# Patient Record
Sex: FEMALE | Race: BLACK OR AFRICAN AMERICAN | NOT HISPANIC OR LATINO | Employment: FULL TIME | RURAL
[De-identification: names, ages, dates, MRNs, and addresses within clinical notes are randomized per-mention and may not be internally consistent; named-entity substitution may affect disease eponyms.]

---

## 2018-01-03 ENCOUNTER — HISTORICAL (OUTPATIENT)
Dept: ADMINISTRATIVE | Facility: HOSPITAL | Age: 23
End: 2018-01-03

## 2018-01-09 LAB
LAB AP CLINICAL INFORMATION: NORMAL
LAB AP GENERAL CAT - HISTORICAL: NORMAL
LAB AP INTERPRETATION/RESULT - HISTORICAL: NORMAL
LAB AP SPECIMEN ADEQUACY - HISTORICAL: NORMAL
LAB AP SPECIMEN SUBMITTED - HISTORICAL: NORMAL

## 2018-07-21 ENCOUNTER — HISTORICAL (OUTPATIENT)
Dept: ADMINISTRATIVE | Facility: HOSPITAL | Age: 23
End: 2018-07-21

## 2018-07-24 LAB
LAB AP CLINICAL INFORMATION: NORMAL
LAB AP DIAGNOSIS - HISTORICAL: NORMAL
LAB AP GROSS PATHOLOGY - HISTORICAL: NORMAL
LAB AP SPECIMEN SUBMITTED - HISTORICAL: NORMAL

## 2020-06-29 ENCOUNTER — HISTORICAL (OUTPATIENT)
Dept: ADMINISTRATIVE | Facility: HOSPITAL | Age: 25
End: 2020-06-29

## 2020-09-28 ENCOUNTER — HISTORICAL (OUTPATIENT)
Dept: ADMINISTRATIVE | Facility: HOSPITAL | Age: 25
End: 2020-09-28

## 2020-09-28 LAB — HCG UR QL IA.RAPID: NEGATIVE

## 2020-09-29 LAB

## 2021-10-22 ENCOUNTER — LAB VISIT (OUTPATIENT)
Dept: PRIMARY CARE CLINIC | Facility: CLINIC | Age: 26
End: 2021-10-22
Payer: MEDICAID

## 2021-10-22 DIAGNOSIS — Z02.83 ENCOUNTER FOR EMPLOYMENT-RELATED DRUG TESTING: ICD-10-CM

## 2021-10-22 PROCEDURE — 99000 PR SPECIMEN HANDLING,DR OFF->LAB: ICD-10-PCS | Mod: ,,, | Performed by: NURSE PRACTITIONER

## 2021-10-22 PROCEDURE — 99000 SPECIMEN HANDLING OFFICE-LAB: CPT | Mod: ,,, | Performed by: NURSE PRACTITIONER

## 2021-12-28 ENCOUNTER — OFFICE VISIT (OUTPATIENT)
Dept: FAMILY MEDICINE | Facility: CLINIC | Age: 26
End: 2021-12-28
Payer: MEDICAID

## 2021-12-28 VITALS
OXYGEN SATURATION: 100 % | BODY MASS INDEX: 36.07 KG/M2 | RESPIRATION RATE: 18 BRPM | HEIGHT: 68 IN | TEMPERATURE: 98 F | HEART RATE: 94 BPM | WEIGHT: 238 LBS | SYSTOLIC BLOOD PRESSURE: 121 MMHG | DIASTOLIC BLOOD PRESSURE: 82 MMHG

## 2021-12-28 DIAGNOSIS — U07.1 COVID-19: Primary | ICD-10-CM

## 2021-12-28 DIAGNOSIS — R05.9 COUGH: ICD-10-CM

## 2021-12-28 LAB
CTP QC/QA: YES
FLUAV AG NPH QL: NEGATIVE
FLUBV AG NPH QL: NEGATIVE
SARS-COV-2 AG RESP QL IA.RAPID: POSITIVE

## 2021-12-28 PROCEDURE — 87428 SARSCOV & INF VIR A&B AG IA: CPT | Mod: QW,,, | Performed by: NURSE PRACTITIONER

## 2021-12-28 PROCEDURE — 87428 POCT SARS-COV2 (COVID) WITH FLU ANTIGEN: ICD-10-PCS | Mod: QW,,, | Performed by: NURSE PRACTITIONER

## 2021-12-28 PROCEDURE — 99213 OFFICE O/P EST LOW 20 MIN: CPT | Mod: ,,, | Performed by: NURSE PRACTITIONER

## 2021-12-28 PROCEDURE — 99213 PR OFFICE/OUTPT VISIT, EST, LEVL III, 20-29 MIN: ICD-10-PCS | Mod: ,,, | Performed by: NURSE PRACTITIONER

## 2021-12-28 RX ORDER — CEFDINIR 300 MG/1
300 CAPSULE ORAL 2 TIMES DAILY
Qty: 14 CAPSULE | Refills: 0 | Status: SHIPPED | OUTPATIENT
Start: 2021-12-28 | End: 2022-01-04

## 2021-12-28 RX ORDER — METHYLPREDNISOLONE 4 MG/1
TABLET ORAL
Qty: 21 TABLET | Refills: 0 | Status: ON HOLD | OUTPATIENT
Start: 2021-12-28 | End: 2023-04-12 | Stop reason: ALTCHOICE

## 2021-12-28 RX ORDER — AZITHROMYCIN 250 MG/1
TABLET, FILM COATED ORAL
Qty: 6 TABLET | Refills: 0 | Status: ON HOLD | OUTPATIENT
Start: 2021-12-28 | End: 2023-04-12 | Stop reason: ALTCHOICE

## 2022-01-06 ENCOUNTER — CLINICAL SUPPORT (OUTPATIENT)
Dept: FAMILY MEDICINE | Facility: CLINIC | Age: 27
End: 2022-01-06
Payer: MEDICAID

## 2022-01-06 DIAGNOSIS — U07.1 COVID-19: Primary | ICD-10-CM

## 2022-01-06 LAB
CTP QC/QA: YES
SARS-COV-2 AG RESP QL IA.RAPID: NEGATIVE

## 2022-01-06 PROCEDURE — 87426 SARS CORONAVIRUS 2 ANTIGEN POCT: ICD-10-PCS | Mod: QW,,, | Performed by: NURSE PRACTITIONER

## 2022-01-06 PROCEDURE — 99499 UNLISTED E&M SERVICE: CPT | Mod: ,,, | Performed by: NURSE PRACTITIONER

## 2022-01-06 PROCEDURE — 87426 SARSCOV CORONAVIRUS AG IA: CPT | Mod: QW,,, | Performed by: NURSE PRACTITIONER

## 2022-01-06 PROCEDURE — 99499 NO LOS: ICD-10-PCS | Mod: ,,, | Performed by: NURSE PRACTITIONER

## 2022-01-06 NOTE — LETTER
January 6, 2022      [facility]  [address] [address]  Phone: [phone]  Fax: [phone]       Patient: Jing Hanson   YOB: 1995  Date of Visit: 01/06/2022    To Whom It May Concern:    Conor Hanson  was at [facility] on 01/06/2022. The patient may return to work/school on 1/7/2022 with no restrictions. She has been under my care since 12/28/2021. If you have any questions or concerns, or if I can be of further assistance, please do not hesitate to contact me.    Sincerely,    Johanny Freeman, DNP

## 2022-01-19 ENCOUNTER — OFFICE VISIT (OUTPATIENT)
Dept: FAMILY MEDICINE | Facility: CLINIC | Age: 27
End: 2022-01-19
Payer: MEDICAID

## 2022-01-19 VITALS
HEART RATE: 85 BPM | WEIGHT: 235 LBS | TEMPERATURE: 97 F | OXYGEN SATURATION: 98 % | DIASTOLIC BLOOD PRESSURE: 70 MMHG | SYSTOLIC BLOOD PRESSURE: 113 MMHG | BODY MASS INDEX: 35.61 KG/M2 | HEIGHT: 68 IN

## 2022-01-19 DIAGNOSIS — Z20.2 EXPOSURE TO SEXUALLY TRANSMITTED DISEASE (STD): ICD-10-CM

## 2022-01-19 DIAGNOSIS — N89.8 VAGINAL DISCHARGE: Primary | ICD-10-CM

## 2022-01-19 LAB
BACTERIA VAG QL WET PREP: ABNORMAL /HPF
CLUE CELLS VAG QL WET PREP: ABNORMAL /HPF
RBC #/AREA VAG WET PREP: ABNORMAL /HPF
SQUAMOUS EPITHELIALS WET WOUNT, GENITAL: ABNORMAL /HPF
T VAGINALIS VAG QL WET PREP: ABNORMAL /HPF
WBC CLUMPS WET MOUNT, GENITAL: ABNORMAL /HPF
WBC VAG QL WET PREP: ABNORMAL /HPF
YEAST VAG QL WET PREP: ABNORMAL /HPF

## 2022-01-19 PROCEDURE — 96372 THER/PROPH/DIAG INJ SC/IM: CPT | Mod: ,,, | Performed by: NURSE PRACTITIONER

## 2022-01-19 PROCEDURE — 87491 CHLAMYDIA/GONORRHOEAE(GC), PCR: ICD-10-PCS | Mod: ,,, | Performed by: CLINICAL MEDICAL LABORATORY

## 2022-01-19 PROCEDURE — 87591 N.GONORRHOEAE DNA AMP PROB: CPT | Mod: ,,, | Performed by: CLINICAL MEDICAL LABORATORY

## 2022-01-19 PROCEDURE — 87491 CHLMYD TRACH DNA AMP PROBE: CPT | Mod: ,,, | Performed by: CLINICAL MEDICAL LABORATORY

## 2022-01-19 PROCEDURE — 99213 OFFICE O/P EST LOW 20 MIN: CPT | Mod: 25,,, | Performed by: NURSE PRACTITIONER

## 2022-01-19 PROCEDURE — 87210 SMEAR WET MOUNT SALINE/INK: CPT | Mod: ,,, | Performed by: CLINICAL MEDICAL LABORATORY

## 2022-01-19 PROCEDURE — 87591 CHLAMYDIA/GONORRHOEAE(GC), PCR: ICD-10-PCS | Mod: ,,, | Performed by: CLINICAL MEDICAL LABORATORY

## 2022-01-19 PROCEDURE — 96372 PR INJECTION,THERAP/PROPH/DIAG2ST, IM OR SUBCUT: ICD-10-PCS | Mod: ,,, | Performed by: NURSE PRACTITIONER

## 2022-01-19 PROCEDURE — 87210 WET PREP, GENITAL: ICD-10-PCS | Mod: ,,, | Performed by: CLINICAL MEDICAL LABORATORY

## 2022-01-19 PROCEDURE — 99213 PR OFFICE/OUTPT VISIT, EST, LEVL III, 20-29 MIN: ICD-10-PCS | Mod: 25,,, | Performed by: NURSE PRACTITIONER

## 2022-01-19 RX ORDER — AZITHROMYCIN 500 MG/1
500 TABLET, FILM COATED ORAL ONCE
Qty: 2 TABLET | Refills: 0 | Status: SHIPPED | OUTPATIENT
Start: 2022-01-19 | End: 2022-01-19

## 2022-01-19 RX ORDER — FLUCONAZOLE 150 MG/1
TABLET ORAL
Qty: 1 TABLET | Refills: 0 | Status: SHIPPED | OUTPATIENT
Start: 2022-01-19 | End: 2022-04-18

## 2022-01-19 RX ORDER — CEFTRIAXONE 1 G/1
1 INJECTION, POWDER, FOR SOLUTION INTRAMUSCULAR; INTRAVENOUS
Status: COMPLETED | OUTPATIENT
Start: 2022-01-19 | End: 2022-01-19

## 2022-01-19 RX ADMIN — CEFTRIAXONE 1 G: 1 INJECTION, POWDER, FOR SOLUTION INTRAMUSCULAR; INTRAVENOUS at 02:01

## 2022-01-19 NOTE — PROGRESS NOTES
"Jing Hanson is a 26 y.o. female No obstetric history on file. presents with complaint of vaginal discharge for 1 week.  She reports itching.  denies odor.  She states the discharge is thick green frothy.  No LMP recorded..    Past Medical History:   Diagnosis Date    Abnormal Pap smear of cervix      Past Surgical History:   Procedure Laterality Date    APPENDECTOMY       OB History   No obstetric history on file.         ROS:  GENERAL: No fever, chills, fatigability or weight loss.  ABDOMEN: No abdominal pain. Denies nausea. Denies vomiting. No diarrhea. No constipation  BREAST: Denies pain. No lumps. No discharge.  URINARY: No incontinence, no nocturia, no frequency and no dysuria.  CARDIOVASCULAR: No chest pain. No shortness of breath. No leg cramps.  NEUROLOGICAL: No headaches. No vision changes.  : See HPI    Vitals:    01/19/22 1335   BP: 113/70   Pulse: 85   Temp: 97.3 °F (36.3 °C)   SpO2: 98%   Weight: 106.6 kg (235 lb)   Height: 5' 8" (1.727 m)         PHYSICAL EXAM:   General : normal appearance  Resp: clear bilaterally  Cards: s1, s2, no murmurs.  GI: soft, nontender, nondistended  : discharge, odor  Vagina- without lesions, with discharge, without erythema, without ulcers        ASSESSMENT and PLAN:  Vaginal discharge  -     Wet Prep, Genital  -     Chlamydia/GC, PCR  -     Trichomonas vaginalis by PCR    Exposure to sexually transmitted disease (STD)  -     cefTRIAXone injection 1 g  -     azithromycin (ZITHROMAX) 500 MG tablet; Take 1 tablet (500 mg total) by mouth once. for 1 dose  Dispense: 2 tablet; Refill: 0    BMI 35.0-35.9,adult    Other orders  -     fluconazole (DIFLUCAN) 150 MG Tab; Take 1 tablet by mouth now, then repeat on day 3  Dispense: 1 tablet; Refill: 0        Pending labs  Sent medication  F/u 1 month for repeat swabs      FOLLOW UP: PRN lack of improvement.  "

## 2022-01-20 LAB
CHLAMYDIA BY PCR: NEGATIVE
N. GONORRHOEAE (GC) BY PCR: POSITIVE

## 2022-02-14 ENCOUNTER — OFFICE VISIT (OUTPATIENT)
Dept: FAMILY MEDICINE | Facility: CLINIC | Age: 27
End: 2022-02-14
Payer: MEDICAID

## 2022-02-14 ENCOUNTER — APPOINTMENT (OUTPATIENT)
Dept: RADIOLOGY | Facility: CLINIC | Age: 27
End: 2022-02-14
Attending: NURSE PRACTITIONER
Payer: MEDICAID

## 2022-02-14 VITALS
DIASTOLIC BLOOD PRESSURE: 78 MMHG | RESPIRATION RATE: 18 BRPM | HEIGHT: 68 IN | HEART RATE: 98 BPM | TEMPERATURE: 97 F | SYSTOLIC BLOOD PRESSURE: 132 MMHG | BODY MASS INDEX: 36.07 KG/M2 | OXYGEN SATURATION: 99 % | WEIGHT: 238 LBS

## 2022-02-14 DIAGNOSIS — R10.9 ABDOMINAL PAIN, UNSPECIFIED ABDOMINAL LOCATION: ICD-10-CM

## 2022-02-14 DIAGNOSIS — N89.8 VAGINAL DISCHARGE: ICD-10-CM

## 2022-02-14 DIAGNOSIS — R10.9 ABDOMINAL PAIN, UNSPECIFIED ABDOMINAL LOCATION: Primary | ICD-10-CM

## 2022-02-14 DIAGNOSIS — Z20.2 EXPOSURE TO SEXUALLY TRANSMITTED DISEASE (STD): ICD-10-CM

## 2022-02-14 LAB
B-HCG UR QL: NEGATIVE
BILIRUB SERPL-MCNC: NEGATIVE MG/DL
BLOOD URINE, POC: NEGATIVE
COLOR, POC UA: YELLOW
CTP QC/QA: YES
GLUCOSE UR QL STRIP: NEGATIVE
KETONES UR QL STRIP: NEGATIVE
LEUKOCYTE ESTERASE URINE, POC: NEGATIVE
NITRITE, POC UA: NEGATIVE
PH, POC UA: 6.5
PROTEIN, POC: NEGATIVE
SPECIFIC GRAVITY, POC UA: 1.02
UROBILINOGEN, POC UA: 0.2

## 2022-02-14 PROCEDURE — 81025 POCT URINE PREGNANCY: ICD-10-PCS | Mod: QW,,, | Performed by: NURSE PRACTITIONER

## 2022-02-14 PROCEDURE — 87086 CULTURE, URINE: ICD-10-PCS | Mod: ,,, | Performed by: CLINICAL MEDICAL LABORATORY

## 2022-02-14 PROCEDURE — 87086 URINE CULTURE/COLONY COUNT: CPT | Mod: ,,, | Performed by: CLINICAL MEDICAL LABORATORY

## 2022-02-14 PROCEDURE — 81025 URINE PREGNANCY TEST: CPT | Mod: QW,,, | Performed by: NURSE PRACTITIONER

## 2022-02-14 PROCEDURE — 99213 OFFICE O/P EST LOW 20 MIN: CPT | Mod: ,,, | Performed by: NURSE PRACTITIONER

## 2022-02-14 PROCEDURE — 74018 RADEX ABDOMEN 1 VIEW: CPT | Mod: TC,RHCUB,FY | Performed by: NURSE PRACTITIONER

## 2022-02-14 PROCEDURE — 81003 POCT URINALYSIS W/O SCOPE: ICD-10-PCS | Mod: QW,,, | Performed by: NURSE PRACTITIONER

## 2022-02-14 PROCEDURE — 87591 CHLAMYDIA/GONORRHOEAE(GC), PCR: ICD-10-PCS | Mod: ,,, | Performed by: CLINICAL MEDICAL LABORATORY

## 2022-02-14 PROCEDURE — 87491 CHLMYD TRACH DNA AMP PROBE: CPT | Mod: ,,, | Performed by: CLINICAL MEDICAL LABORATORY

## 2022-02-14 PROCEDURE — 74018 RADEX ABDOMEN 1 VIEW: CPT | Mod: 26,,, | Performed by: RADIOLOGY

## 2022-02-14 PROCEDURE — 87591 N.GONORRHOEAE DNA AMP PROB: CPT | Mod: ,,, | Performed by: CLINICAL MEDICAL LABORATORY

## 2022-02-14 PROCEDURE — 87661 TRICHOMONAS VAGINALIS BY PCR: ICD-10-PCS | Mod: ,,, | Performed by: CLINICAL MEDICAL LABORATORY

## 2022-02-14 PROCEDURE — 74018 XR KUB: ICD-10-PCS | Mod: 26,,, | Performed by: RADIOLOGY

## 2022-02-14 PROCEDURE — 99213 PR OFFICE/OUTPT VISIT, EST, LEVL III, 20-29 MIN: ICD-10-PCS | Mod: ,,, | Performed by: NURSE PRACTITIONER

## 2022-02-14 PROCEDURE — 81003 URINALYSIS AUTO W/O SCOPE: CPT | Mod: QW,,, | Performed by: NURSE PRACTITIONER

## 2022-02-14 PROCEDURE — 87491 CHLAMYDIA/GONORRHOEAE(GC), PCR: ICD-10-PCS | Mod: ,,, | Performed by: CLINICAL MEDICAL LABORATORY

## 2022-02-14 PROCEDURE — 87661 TRICHOMONAS VAGINALIS AMPLIF: CPT | Mod: ,,, | Performed by: CLINICAL MEDICAL LABORATORY

## 2022-02-14 RX ORDER — METRONIDAZOLE 500 MG/1
500 TABLET ORAL EVERY 12 HOURS
Qty: 14 TABLET | Refills: 0 | Status: ON HOLD | OUTPATIENT
Start: 2022-02-14 | End: 2023-04-12 | Stop reason: ALTCHOICE

## 2022-02-14 NOTE — PROGRESS NOTES
Johanny Freeman DNP   [address] [place] [address]     PATIENT NAME: Jing Hanson  : 1995  DATE: 22  MRN: [id number]      Billing Provider: Johanny Freeman DNP  Level of Service:   Patient PCP Information     Provider PCP Type    Johanny Freeman DNP General          Reason for Visit / Chief Complaint: Abdominal Pain (Right lower quad pain ) and Rectal Pain       Update PCP  Update Chief Complaint         History of Present Illness / Problem Focused Workflow     Jing Hanson presents to the clinic with Abdominal Pain (Right lower quad pain ) and Rectal Pain     HPI    Review of Systems     Review of Systems     Medical / Social / Family History     Past Medical History:   Diagnosis Date    Abnormal Pap smear of cervix        Past Surgical History:   Procedure Laterality Date    APPENDECTOMY         Social History  Ms.  reports that she has never smoked. She has never used smokeless tobacco. She reports that she does not drink alcohol and does not use drugs.    Family History  Ms.'s family history is not on file.    Medications and Allergies     Medications  No outpatient medications have been marked as taking for the 22 encounter (Office Visit) with Johanny Freeman DNP.       Allergies  Review of patient's allergies indicates:  No Known Allergies    Physical Examination     Vitals:    22 0923   BP: 132/78   Pulse: 98   Resp: 18   Temp: 97.2 °F (36.2 °C)     Physical Exam  Vitals and nursing note reviewed.   HENT:      Head: Normocephalic.      Nose: Nose normal.      Mouth/Throat:      Mouth: Mucous membranes are moist.   Eyes:      Extraocular Movements: Extraocular movements intact.      Conjunctiva/sclera: Conjunctivae normal.      Pupils: Pupils are equal, round, and reactive to light.   Cardiovascular:      Rate and Rhythm: Normal rate and regular rhythm.      Pulses: Normal pulses.      Heart sounds: Normal heart sounds.   Pulmonary:      Effort: Pulmonary  effort is normal.      Breath sounds: Normal breath sounds.   Abdominal:      General: Abdomen is flat. Bowel sounds are normal.      Palpations: Abdomen is soft.      Tenderness: There is abdominal tenderness.      Comments: Left sided abdominal pain almost flank pain   Musculoskeletal:      Cervical back: Normal range of motion.   Skin:     General: Skin is warm and dry.      Capillary Refill: Capillary refill takes less than 2 seconds.   Neurological:      General: No focal deficit present.      Mental Status: She is alert and oriented to person, place, and time. Mental status is at baseline.   Psychiatric:         Mood and Affect: Mood normal.         Behavior: Behavior normal.         Thought Content: Thought content normal.         Judgment: Judgment normal.          Assessment and Plan (including Health Maintenance)      Problem List  Smart Sets  Document Outside HM   :    Plan:   Recent +stf testing.   Repeat today to ensure cleared.  Consider PID -   Abundant stool on xray. Start miralax      Health Maintenance Due   Topic Date Due    Hepatitis C Screening  Never done    Lipid Panel  Never done    COVID-19 Vaccine (1) Never done    HPV Vaccines (1 - 2-dose series) Never done    HIV Screening  Never done    TETANUS VACCINE  Never done    Pap Smear  01/03/2021    Influenza Vaccine (1) Never done       Problem List Items Addressed This Visit        Renal/    Vaginal discharge - Primary    Relevant Orders    Chlamydia/GC, PCR      Other Visit Diagnoses     Abdominal pain, unspecified abdominal location        Relevant Orders    POCT URINALYSIS W/O SCOPE (Completed)    POCT urine pregnancy (Completed)    Urine culture    X-Ray KUB (Completed)          The patient has no Health Maintenance topics of status Not Due    No future appointments.         Signature:  Johanny Freeman, CANDY      [address] [place] [address]    Date of encounter: 2/14/22

## 2022-02-15 LAB
CHLAMYDIA BY PCR: NEGATIVE
N. GONORRHOEAE (GC) BY PCR: NEGATIVE
TRICHOMONAS NAT: POSITIVE

## 2022-02-15 NOTE — PROGRESS NOTES
Come back in asap. Treated with flagyl yesterday. Because I assumed she had PID - needs azithroymcin 500 2 tabs at once and rocephin 1g.

## 2022-02-16 LAB — UA COMPLETE W REFLEX CULTURE PNL UR: NORMAL

## 2022-02-16 RX ORDER — POLYETHYLENE GLYCOL 3350 17 G/17G
17 POWDER, FOR SOLUTION ORAL DAILY
COMMUNITY
End: 2022-02-16 | Stop reason: SDUPTHER

## 2022-02-16 RX ORDER — POLYETHYLENE GLYCOL 3350 17 G/17G
17 POWDER, FOR SOLUTION ORAL DAILY
Qty: 235 G | Refills: 1 | Status: SHIPPED | OUTPATIENT
Start: 2022-02-16 | End: 2022-06-27 | Stop reason: SDUPTHER

## 2022-03-28 ENCOUNTER — OFFICE VISIT (OUTPATIENT)
Dept: FAMILY MEDICINE | Facility: CLINIC | Age: 27
End: 2022-03-28
Payer: MEDICAID

## 2022-03-28 VITALS
DIASTOLIC BLOOD PRESSURE: 72 MMHG | OXYGEN SATURATION: 99 % | TEMPERATURE: 97 F | HEART RATE: 71 BPM | WEIGHT: 246.63 LBS | BODY MASS INDEX: 37.38 KG/M2 | SYSTOLIC BLOOD PRESSURE: 111 MMHG | HEIGHT: 68 IN

## 2022-03-28 DIAGNOSIS — Z20.2 EXPOSURE TO SEXUALLY TRANSMITTED DISEASE (STD): Primary | ICD-10-CM

## 2022-03-28 PROCEDURE — 99212 OFFICE O/P EST SF 10 MIN: CPT | Mod: ,,, | Performed by: NURSE PRACTITIONER

## 2022-03-28 PROCEDURE — 87661 TRICHOMONAS VAGINALIS AMPLIF: CPT | Mod: ,,, | Performed by: CLINICAL MEDICAL LABORATORY

## 2022-03-28 PROCEDURE — 99212 PR OFFICE/OUTPT VISIT, EST, LEVL II, 10-19 MIN: ICD-10-PCS | Mod: ,,, | Performed by: NURSE PRACTITIONER

## 2022-03-28 PROCEDURE — 87661 TRICHOMONAS VAGINALIS BY PCR: ICD-10-PCS | Mod: ,,, | Performed by: CLINICAL MEDICAL LABORATORY

## 2022-03-28 NOTE — PROGRESS NOTES
"Jing Hanson is a 26 y.o. female No obstetric history on file. presents with complaint of vaginal discharge for resolved.  She denies itching.  denies odor.  She states the discharge is .  Patient's last menstrual period was 03/22/2022 (exact date)..    Past Medical History:   Diagnosis Date    Abnormal Pap smear of cervix      Past Surgical History:   Procedure Laterality Date    APPENDECTOMY       OB History   No obstetric history on file.         ROS:  GENERAL: No fever, chills, fatigability or weight loss.  ABDOMEN: No abdominal pain. Denies nausea. Denies vomiting. No diarrhea. No constipation  BREAST: Denies pain. No lumps. No discharge.  URINARY: No incontinence, no nocturia, no frequency and no dysuria.  CARDIOVASCULAR: No chest pain. No shortness of breath. No leg cramps.  NEUROLOGICAL: No headaches. No vision changes.  : See HPI    Vitals:    03/28/22 1018   BP: 111/72   Pulse: 71   Temp: 96.7 °F (35.9 °C)   SpO2: 99%   Weight: 111.9 kg (246 lb 9.6 oz)   Height: 5' 8" (1.727 m)         PHYSICAL EXAM:   General : normal appearance  Resp: clear bilaterally  Cards: s1, s2, no murmurs.  GI: soft, nontender, nondistended  :  Without discharge, odor  Vagina- without lesions, without discharge, without erythema, without ulcers        ASSESSMENT and PLAN:  Exposure to sexually transmitted disease (STD)  -     Cancel: Trichomonas vaginalis by PCR; Future; Expected date: 03/28/2022  -     Trichomonas vaginalis by PCR        Pending labs  Sent medication  F/u 1 month for repeat swabs if needed  Was left off last visit.     FOLLOW UP: PRN lack of improvement.  Answers for HPI/ROS submitted by the patient on 3/27/2022  activity change: No  unexpected weight change: No  neck pain: No  hearing loss: No  rhinorrhea: Yes  trouble swallowing: No  eye discharge: No  visual disturbance: No  chest tightness: No  wheezing: No  chest pain: No  palpitations: No  blood in stool: No  constipation: No  vomiting: No  diarrhea: " No  polydipsia: No  polyuria: No  difficulty urinating: No  hematuria: No  menstrual problem: No  dysuria: No  joint swelling: No  arthralgias: No  headaches: No  weakness: No  confusion: No  dysphoric mood: No

## 2022-03-29 LAB — TRICHOMONAS NAT: NEGATIVE

## 2022-04-11 ENCOUNTER — OFFICE VISIT (OUTPATIENT)
Dept: FAMILY MEDICINE | Facility: CLINIC | Age: 27
End: 2022-04-11
Payer: MEDICAID

## 2022-04-11 VITALS
DIASTOLIC BLOOD PRESSURE: 83 MMHG | WEIGHT: 249.63 LBS | SYSTOLIC BLOOD PRESSURE: 125 MMHG | BODY MASS INDEX: 37.83 KG/M2 | HEART RATE: 79 BPM | OXYGEN SATURATION: 99 % | HEIGHT: 68 IN

## 2022-04-11 DIAGNOSIS — R73.9 HYPERGLYCEMIA: ICD-10-CM

## 2022-04-11 DIAGNOSIS — D64.9 ANEMIA, UNSPECIFIED TYPE: ICD-10-CM

## 2022-04-11 DIAGNOSIS — Z11.59 SCREENING FOR VIRAL DISEASE: ICD-10-CM

## 2022-04-11 DIAGNOSIS — Z79.899 ON LONG TERM DRUG THERAPY: ICD-10-CM

## 2022-04-11 DIAGNOSIS — R03.0 ELEVATED BLOOD PRESSURE READING: Primary | ICD-10-CM

## 2022-04-11 DIAGNOSIS — Z13.6 ENCOUNTER FOR SCREENING FOR CARDIOVASCULAR DISORDERS: ICD-10-CM

## 2022-04-11 LAB
25(OH)D3 SERPL-MCNC: 11.5 NG/ML
ALBUMIN SERPL BCP-MCNC: 3.4 G/DL (ref 3.5–5)
ALBUMIN/GLOB SERPL: 0.9 {RATIO}
ALP SERPL-CCNC: 90 U/L (ref 37–98)
ALT SERPL W P-5'-P-CCNC: 32 U/L (ref 13–56)
ANION GAP SERPL CALCULATED.3IONS-SCNC: 9 MMOL/L (ref 7–16)
AST SERPL W P-5'-P-CCNC: 15 U/L (ref 15–37)
BASOPHILS # BLD AUTO: 0.05 K/UL (ref 0–0.2)
BASOPHILS NFR BLD AUTO: 0.6 % (ref 0–1)
BILIRUB SERPL-MCNC: 0.2 MG/DL (ref 0–1.2)
BUN SERPL-MCNC: 13 MG/DL (ref 7–18)
BUN/CREAT SERPL: 21 (ref 6–20)
CALCIUM SERPL-MCNC: 9.2 MG/DL (ref 8.5–10.1)
CHLORIDE SERPL-SCNC: 105 MMOL/L (ref 98–107)
CHOLEST SERPL-MCNC: 161 MG/DL (ref 0–200)
CHOLEST/HDLC SERPL: 4.7 {RATIO}
CO2 SERPL-SCNC: 28 MMOL/L (ref 21–32)
CREAT SERPL-MCNC: 0.61 MG/DL (ref 0.55–1.02)
DIFFERENTIAL METHOD BLD: ABNORMAL
EOSINOPHIL # BLD AUTO: 0.2 K/UL (ref 0–0.5)
EOSINOPHIL NFR BLD AUTO: 2.2 % (ref 1–4)
ERYTHROCYTE [DISTWIDTH] IN BLOOD BY AUTOMATED COUNT: 15.2 % (ref 11.5–14.5)
EST. AVERAGE GLUCOSE BLD GHB EST-MCNC: 94 MG/DL
FOLATE SERPL-MCNC: 4 NG/ML (ref 3.1–17.5)
GLOBULIN SER-MCNC: 3.9 G/DL (ref 2–4)
GLUCOSE SERPL-MCNC: 81 MG/DL (ref 74–106)
HBA1C MFR BLD HPLC: 5.4 % (ref 4.5–6.6)
HCT VFR BLD AUTO: 39.8 % (ref 38–47)
HCV AB SER QL: NORMAL
HDLC SERPL-MCNC: 34 MG/DL (ref 40–60)
HGB BLD-MCNC: 12.2 G/DL (ref 12–16)
IMM GRANULOCYTES # BLD AUTO: 0.01 K/UL (ref 0–0.04)
IMM GRANULOCYTES NFR BLD: 0.1 % (ref 0–0.4)
IRON SATN MFR SERPL: 10 % (ref 14–50)
IRON SERPL-MCNC: 39 ΜG/DL (ref 50–170)
LDLC SERPL CALC-MCNC: 108 MG/DL
LDLC/HDLC SERPL: 3.2 {RATIO}
LYMPHOCYTES # BLD AUTO: 2.13 K/UL (ref 1–4.8)
LYMPHOCYTES NFR BLD AUTO: 23.7 % (ref 27–41)
MCH RBC QN AUTO: 26.8 PG (ref 27–31)
MCHC RBC AUTO-ENTMCNC: 30.7 G/DL (ref 32–36)
MCV RBC AUTO: 87.3 FL (ref 80–96)
MONOCYTES # BLD AUTO: 0.52 K/UL (ref 0–0.8)
MONOCYTES NFR BLD AUTO: 5.8 % (ref 2–6)
MPC BLD CALC-MCNC: 12.2 FL (ref 9.4–12.4)
NEUTROPHILS # BLD AUTO: 6.06 K/UL (ref 1.8–7.7)
NEUTROPHILS NFR BLD AUTO: 67.6 % (ref 53–65)
NONHDLC SERPL-MCNC: 127 MG/DL
NRBC # BLD AUTO: 0 X10E3/UL
NRBC, AUTO (.00): 0 %
PLATELET # BLD AUTO: 302 K/UL (ref 150–400)
POTASSIUM SERPL-SCNC: 4.3 MMOL/L (ref 3.5–5.1)
PROT SERPL-MCNC: 7.3 G/DL (ref 6.4–8.2)
RBC # BLD AUTO: 4.56 M/UL (ref 4.2–5.4)
SODIUM SERPL-SCNC: 138 MMOL/L (ref 136–145)
T4 FREE SERPL-MCNC: 0.88 NG/DL (ref 0.76–1.46)
TIBC SERPL-MCNC: 399 ΜG/DL (ref 250–450)
TRIGL SERPL-MCNC: 94 MG/DL (ref 35–150)
TSH SERPL DL<=0.005 MIU/L-ACNC: 2.94 UIU/ML (ref 0.36–3.74)
VIT B12 SERPL-MCNC: 224 PG/ML (ref 193–986)
VLDLC SERPL-MCNC: 19 MG/DL
WBC # BLD AUTO: 8.97 K/UL (ref 4.5–11)

## 2022-04-11 PROCEDURE — 80053 PR  METABOLIC PANEL,COMPREHENSIVE: ICD-10-PCS | Mod: ,,, | Performed by: CLINICAL MEDICAL LABORATORY

## 2022-04-11 PROCEDURE — 82607 VITAMIN B12/FOLATE, SERUM PANEL: ICD-10-PCS | Mod: ,,, | Performed by: CLINICAL MEDICAL LABORATORY

## 2022-04-11 PROCEDURE — 82746 ASSAY OF FOLIC ACID SERUM: CPT | Mod: ,,, | Performed by: CLINICAL MEDICAL LABORATORY

## 2022-04-11 PROCEDURE — 80061 LIPID PANEL: CPT | Mod: ,,, | Performed by: CLINICAL MEDICAL LABORATORY

## 2022-04-11 PROCEDURE — 83036 HEMOGLOBIN A1C: ICD-10-PCS | Mod: ,,, | Performed by: CLINICAL MEDICAL LABORATORY

## 2022-04-11 PROCEDURE — 82746 VITAMIN B12/FOLATE, SERUM PANEL: ICD-10-PCS | Mod: ,,, | Performed by: CLINICAL MEDICAL LABORATORY

## 2022-04-11 PROCEDURE — 80061 LIPID PANEL: ICD-10-PCS | Mod: ,,, | Performed by: CLINICAL MEDICAL LABORATORY

## 2022-04-11 PROCEDURE — 85025 COMPLETE CBC W/AUTO DIFF WBC: CPT | Mod: ,,, | Performed by: CLINICAL MEDICAL LABORATORY

## 2022-04-11 PROCEDURE — 84443 PR  ASSAY THYROID STIM HORMONE: ICD-10-PCS | Mod: ,,, | Performed by: CLINICAL MEDICAL LABORATORY

## 2022-04-11 PROCEDURE — 82607 VITAMIN B-12: CPT | Mod: ,,, | Performed by: CLINICAL MEDICAL LABORATORY

## 2022-04-11 PROCEDURE — 99214 PR OFFICE/OUTPT VISIT, EST, LEVL IV, 30-39 MIN: ICD-10-PCS | Mod: ,,, | Performed by: NURSE PRACTITIONER

## 2022-04-11 PROCEDURE — 84439 THYROID PANEL: ICD-10-PCS | Mod: ,,, | Performed by: CLINICAL MEDICAL LABORATORY

## 2022-04-11 PROCEDURE — 86803 HEPATITIS C AB TEST: CPT | Mod: ,,, | Performed by: CLINICAL MEDICAL LABORATORY

## 2022-04-11 PROCEDURE — 83550 IRON BINDING TEST: CPT | Mod: ,,, | Performed by: CLINICAL MEDICAL LABORATORY

## 2022-04-11 PROCEDURE — 83540 ASSAY OF IRON: CPT | Mod: ,,, | Performed by: CLINICAL MEDICAL LABORATORY

## 2022-04-11 PROCEDURE — 82306 VITAMIN D: ICD-10-PCS | Mod: ,,, | Performed by: CLINICAL MEDICAL LABORATORY

## 2022-04-11 PROCEDURE — 82306 VITAMIN D 25 HYDROXY: CPT | Mod: ,,, | Performed by: CLINICAL MEDICAL LABORATORY

## 2022-04-11 PROCEDURE — 84443 ASSAY THYROID STIM HORMONE: CPT | Mod: ,,, | Performed by: CLINICAL MEDICAL LABORATORY

## 2022-04-11 PROCEDURE — 80053 COMPREHEN METABOLIC PANEL: CPT | Mod: ,,, | Performed by: CLINICAL MEDICAL LABORATORY

## 2022-04-11 PROCEDURE — 84439 ASSAY OF FREE THYROXINE: CPT | Mod: ,,, | Performed by: CLINICAL MEDICAL LABORATORY

## 2022-04-11 PROCEDURE — 83036 HEMOGLOBIN GLYCOSYLATED A1C: CPT | Mod: ,,, | Performed by: CLINICAL MEDICAL LABORATORY

## 2022-04-11 PROCEDURE — 85025 PR  COMPLETE CBC & AUTO DIFF WBC: ICD-10-PCS | Mod: ,,, | Performed by: CLINICAL MEDICAL LABORATORY

## 2022-04-11 PROCEDURE — 83540 IRON AND TIBC: ICD-10-PCS | Mod: ,,, | Performed by: CLINICAL MEDICAL LABORATORY

## 2022-04-11 PROCEDURE — 99214 OFFICE O/P EST MOD 30 MIN: CPT | Mod: ,,, | Performed by: NURSE PRACTITIONER

## 2022-04-11 PROCEDURE — 83550 IRON AND TIBC: ICD-10-PCS | Mod: ,,, | Performed by: CLINICAL MEDICAL LABORATORY

## 2022-04-11 PROCEDURE — 86803 HEPATITIS C ANTIBODY: ICD-10-PCS | Mod: ,,, | Performed by: CLINICAL MEDICAL LABORATORY

## 2022-04-11 NOTE — PROGRESS NOTES
Johanny Freeman DNP   [address] [place] [address]     PATIENT NAME: Jing Hanson  : 1995  DATE: 22  MRN: [id number]      Billing Provider: Johanny Freeman DNP  Level of Service:   Patient PCP Information     Provider PCP Type    Johanny Freeman DNP General          Reason for Visit / Chief Complaint: Follow-up       Update PCP  Update Chief Complaint         History of Present Illness / Problem Focused Workflow     Jing Hanson presents to the clinic with Follow-up     HPI    Review of Systems     Review of Systems     Medical / Social / Family History     Past Medical History:   Diagnosis Date    Abnormal Pap smear of cervix        Past Surgical History:   Procedure Laterality Date    APPENDECTOMY         Social History  Ms.  reports that she has never smoked. She has never used smokeless tobacco. She reports that she does not drink alcohol and does not use drugs.    Family History  Ms.'s family history is not on file.    Medications and Allergies     Medications  No outpatient medications have been marked as taking for the 22 encounter (Office Visit) with Johanny Freeman DNP.       Allergies  Review of patient's allergies indicates:  No Known Allergies    Physical Examination     Vitals:    22 0936   BP: 125/83   Pulse: 79     Physical Exam  Vitals and nursing note reviewed.   Constitutional:       Appearance: She is obese.   HENT:      Head: Normocephalic.      Nose: Nose normal.      Mouth/Throat:      Mouth: Mucous membranes are moist.   Eyes:      Extraocular Movements: Extraocular movements intact.      Conjunctiva/sclera: Conjunctivae normal.      Pupils: Pupils are equal, round, and reactive to light.   Cardiovascular:      Rate and Rhythm: Normal rate and regular rhythm.      Pulses: Normal pulses.      Heart sounds: Normal heart sounds.   Pulmonary:      Effort: Pulmonary effort is normal.      Breath sounds: Normal breath sounds.   Abdominal:       General: Abdomen is flat. Bowel sounds are normal.      Palpations: Abdomen is soft.   Musculoskeletal:      Cervical back: Normal range of motion.   Skin:     General: Skin is warm and dry.      Capillary Refill: Capillary refill takes less than 2 seconds.   Neurological:      General: No focal deficit present.      Mental Status: She is alert and oriented to person, place, and time. Mental status is at baseline.   Psychiatric:         Mood and Affect: Mood normal.         Behavior: Behavior normal.         Thought Content: Thought content normal.         Judgment: Judgment normal.          Assessment and Plan (including Health Maintenance)      Problem List  Smart Sets  Document Outside HM   :    Plan:         Health Maintenance Due   Topic Date Due    Hepatitis C Screening  Never done    Lipid Panel  Never done       Problem List Items Addressed This Visit        Cardiac/Vascular    Elevated blood pressure reading - Primary    Relevant Orders    CBC Auto Differential    Comprehensive Metabolic Panel    Encounter for screening for cardiovascular disorders    Relevant Orders    Lipid Panel       ID    Screening for viral disease    Relevant Orders    Hepatitis C Antibody       Oncology    Anemia    Relevant Orders    Iron and TIBC       Endocrine    BMI 35.0-35.9,adult    Hyperglycemia    Relevant Orders    Hemoglobin A1C       Other    On long term drug therapy    Relevant Orders    Vitamin D    Vitamin B12 & Folate    Thyroid Panel          The patient has no Health Maintenance topics of status Not Due    Future Appointments   Date Time Provider Department Center   10/11/2022  8:15 AM Johanny Freeman DNP [facility] [place]            Signature:  Johanny Freeman DNP      [address] [place] [address]    Date of encounter: 4/11/22

## 2022-04-18 RX ORDER — ERGOCALCIFEROL 1.25 MG/1
50000 CAPSULE ORAL
COMMUNITY
End: 2022-04-18 | Stop reason: SDUPTHER

## 2022-04-18 RX ORDER — ERGOCALCIFEROL 1.25 MG/1
50000 CAPSULE ORAL
Qty: 5 CAPSULE | Refills: 4 | Status: SHIPPED | OUTPATIENT
Start: 2022-04-18 | End: 2023-09-01

## 2022-04-20 ENCOUNTER — CLINICAL SUPPORT (OUTPATIENT)
Dept: FAMILY MEDICINE | Facility: CLINIC | Age: 27
End: 2022-04-20
Payer: MEDICAID

## 2022-04-20 DIAGNOSIS — E53.8 B12 DEFICIENCY: Primary | ICD-10-CM

## 2022-04-20 PROCEDURE — 96372 THER/PROPH/DIAG INJ SC/IM: CPT | Mod: ,,, | Performed by: NURSE PRACTITIONER

## 2022-04-20 PROCEDURE — 96372 PR INJECTION,THERAP/PROPH/DIAG2ST, IM OR SUBCUT: ICD-10-PCS | Mod: ,,, | Performed by: NURSE PRACTITIONER

## 2022-04-20 RX ORDER — CYANOCOBALAMIN 1000 UG/ML
1000 INJECTION, SOLUTION INTRAMUSCULAR; SUBCUTANEOUS ONCE
Status: COMPLETED | OUTPATIENT
Start: 2022-04-20 | End: 2022-04-20

## 2022-04-20 RX ADMIN — CYANOCOBALAMIN 1000 MCG: 1000 INJECTION, SOLUTION INTRAMUSCULAR; SUBCUTANEOUS at 09:04

## 2022-04-27 ENCOUNTER — CLINICAL SUPPORT (OUTPATIENT)
Dept: FAMILY MEDICINE | Facility: CLINIC | Age: 27
End: 2022-04-27
Payer: MEDICAID

## 2022-04-27 DIAGNOSIS — E53.8 B12 DEFICIENCY: Primary | ICD-10-CM

## 2022-04-27 PROCEDURE — 96372 PR INJECTION,THERAP/PROPH/DIAG2ST, IM OR SUBCUT: ICD-10-PCS | Mod: ,,, | Performed by: NURSE PRACTITIONER

## 2022-04-27 PROCEDURE — 96372 THER/PROPH/DIAG INJ SC/IM: CPT | Mod: ,,, | Performed by: NURSE PRACTITIONER

## 2022-04-27 RX ORDER — CYANOCOBALAMIN 1000 UG/ML
1000 INJECTION, SOLUTION INTRAMUSCULAR; SUBCUTANEOUS ONCE
Status: COMPLETED | OUTPATIENT
Start: 2022-04-27 | End: 2022-04-27

## 2022-04-27 RX ADMIN — CYANOCOBALAMIN 1000 MCG: 1000 INJECTION, SOLUTION INTRAMUSCULAR; SUBCUTANEOUS at 08:04

## 2022-06-02 ENCOUNTER — CLINICAL SUPPORT (OUTPATIENT)
Dept: FAMILY MEDICINE | Facility: CLINIC | Age: 27
End: 2022-06-02

## 2022-06-02 DIAGNOSIS — E53.8 B12 DEFICIENCY: Primary | ICD-10-CM

## 2022-06-02 PROCEDURE — 96372 THER/PROPH/DIAG INJ SC/IM: CPT | Mod: ,,, | Performed by: NURSE PRACTITIONER

## 2022-06-02 PROCEDURE — 96372 PR INJECTION,THERAP/PROPH/DIAG2ST, IM OR SUBCUT: ICD-10-PCS | Mod: ,,, | Performed by: NURSE PRACTITIONER

## 2022-06-02 RX ORDER — CYANOCOBALAMIN 1000 UG/ML
1000 INJECTION, SOLUTION INTRAMUSCULAR; SUBCUTANEOUS ONCE
Status: COMPLETED | OUTPATIENT
Start: 2022-06-02 | End: 2022-06-02

## 2022-06-02 RX ADMIN — CYANOCOBALAMIN 1000 MCG: 1000 INJECTION, SOLUTION INTRAMUSCULAR; SUBCUTANEOUS at 11:06

## 2022-06-27 ENCOUNTER — APPOINTMENT (OUTPATIENT)
Dept: RADIOLOGY | Facility: CLINIC | Age: 27
End: 2022-06-27
Attending: NURSE PRACTITIONER
Payer: MEDICAID

## 2022-06-27 ENCOUNTER — OFFICE VISIT (OUTPATIENT)
Dept: FAMILY MEDICINE | Facility: CLINIC | Age: 27
End: 2022-06-27
Payer: MEDICAID

## 2022-06-27 VITALS
HEART RATE: 78 BPM | OXYGEN SATURATION: 98 % | DIASTOLIC BLOOD PRESSURE: 84 MMHG | WEIGHT: 256.38 LBS | HEIGHT: 68 IN | SYSTOLIC BLOOD PRESSURE: 129 MMHG | BODY MASS INDEX: 38.86 KG/M2

## 2022-06-27 DIAGNOSIS — R10.32 LEFT LOWER QUADRANT ABDOMINAL PAIN: ICD-10-CM

## 2022-06-27 DIAGNOSIS — K59.00 CONSTIPATION, UNSPECIFIED CONSTIPATION TYPE: Primary | ICD-10-CM

## 2022-06-27 LAB
ALBUMIN SERPL BCP-MCNC: 3.4 G/DL (ref 3.5–5)
ALBUMIN/GLOB SERPL: 0.9 {RATIO}
ALP SERPL-CCNC: 87 U/L (ref 37–98)
ALT SERPL W P-5'-P-CCNC: 24 U/L (ref 13–56)
ANION GAP SERPL CALCULATED.3IONS-SCNC: 10 MMOL/L (ref 7–16)
AST SERPL W P-5'-P-CCNC: 20 U/L (ref 15–37)
BASOPHILS # BLD AUTO: 0.06 K/UL (ref 0–0.2)
BASOPHILS NFR BLD AUTO: 0.6 % (ref 0–1)
BILIRUB SERPL-MCNC: 0.2 MG/DL (ref 0–1.2)
BUN SERPL-MCNC: 16 MG/DL (ref 7–18)
BUN/CREAT SERPL: 31 (ref 6–20)
CALCIUM SERPL-MCNC: 9.5 MG/DL (ref 8.5–10.1)
CHLORIDE SERPL-SCNC: 106 MMOL/L (ref 98–107)
CO2 SERPL-SCNC: 27 MMOL/L (ref 21–32)
CREAT SERPL-MCNC: 0.52 MG/DL (ref 0.55–1.02)
DIFFERENTIAL METHOD BLD: ABNORMAL
EOSINOPHIL # BLD AUTO: 0.32 K/UL (ref 0–0.5)
EOSINOPHIL NFR BLD AUTO: 3.1 % (ref 1–4)
ERYTHROCYTE [DISTWIDTH] IN BLOOD BY AUTOMATED COUNT: 14.5 % (ref 11.5–14.5)
GLOBULIN SER-MCNC: 3.9 G/DL (ref 2–4)
GLUCOSE SERPL-MCNC: 75 MG/DL (ref 74–106)
HCT VFR BLD AUTO: 39.7 % (ref 38–47)
HGB BLD-MCNC: 12.8 G/DL (ref 12–16)
IMM GRANULOCYTES # BLD AUTO: 0.02 K/UL (ref 0–0.04)
IMM GRANULOCYTES NFR BLD: 0.2 % (ref 0–0.4)
LYMPHOCYTES # BLD AUTO: 1.75 K/UL (ref 1–4.8)
LYMPHOCYTES NFR BLD AUTO: 17.2 % (ref 27–41)
MCH RBC QN AUTO: 27 PG (ref 27–31)
MCHC RBC AUTO-ENTMCNC: 32.2 G/DL (ref 32–36)
MCV RBC AUTO: 83.8 FL (ref 80–96)
MONOCYTES # BLD AUTO: 0.71 K/UL (ref 0–0.8)
MONOCYTES NFR BLD AUTO: 7 % (ref 2–6)
MPC BLD CALC-MCNC: 11.9 FL (ref 9.4–12.4)
NEUTROPHILS # BLD AUTO: 7.32 K/UL (ref 1.8–7.7)
NEUTROPHILS NFR BLD AUTO: 71.9 % (ref 53–65)
NRBC # BLD AUTO: 0 X10E3/UL
NRBC, AUTO (.00): 0 %
PLATELET # BLD AUTO: 297 K/UL (ref 150–400)
POTASSIUM SERPL-SCNC: 4 MMOL/L (ref 3.5–5.1)
PROT SERPL-MCNC: 7.3 G/DL (ref 6.4–8.2)
RBC # BLD AUTO: 4.74 M/UL (ref 4.2–5.4)
SODIUM SERPL-SCNC: 139 MMOL/L (ref 136–145)
WBC # BLD AUTO: 10.18 K/UL (ref 4.5–11)

## 2022-06-27 PROCEDURE — 80053 COMPREHEN METABOLIC PANEL: CPT | Mod: ,,, | Performed by: CLINICAL MEDICAL LABORATORY

## 2022-06-27 PROCEDURE — 99213 OFFICE O/P EST LOW 20 MIN: CPT | Mod: ,,, | Performed by: NURSE PRACTITIONER

## 2022-06-27 PROCEDURE — 74018 RADEX ABDOMEN 1 VIEW: CPT | Mod: 26,,, | Performed by: RADIOLOGY

## 2022-06-27 PROCEDURE — 80053 COMPREHENSIVE METABOLIC PANEL: ICD-10-PCS | Mod: ,,, | Performed by: CLINICAL MEDICAL LABORATORY

## 2022-06-27 PROCEDURE — 99213 PR OFFICE/OUTPT VISIT, EST, LEVL III, 20-29 MIN: ICD-10-PCS | Mod: ,,, | Performed by: NURSE PRACTITIONER

## 2022-06-27 PROCEDURE — 74018 RADEX ABDOMEN 1 VIEW: CPT | Mod: TC,RHCUB,FY | Performed by: NURSE PRACTITIONER

## 2022-06-27 PROCEDURE — 74018 XR KUB: ICD-10-PCS | Mod: 26,,, | Performed by: RADIOLOGY

## 2022-06-27 PROCEDURE — 85025 CBC WITH DIFFERENTIAL: ICD-10-PCS | Mod: ,,, | Performed by: CLINICAL MEDICAL LABORATORY

## 2022-06-27 PROCEDURE — 85025 COMPLETE CBC W/AUTO DIFF WBC: CPT | Mod: ,,, | Performed by: CLINICAL MEDICAL LABORATORY

## 2022-06-27 RX ORDER — PLECANATIDE 3 MG/1
3 TABLET ORAL DAILY
Qty: 90 TABLET | Refills: 1 | Status: ON HOLD | OUTPATIENT
Start: 2022-06-27 | End: 2023-04-12 | Stop reason: ALTCHOICE

## 2022-06-27 RX ORDER — POLYETHYLENE GLYCOL 3350 17 G/17G
17 POWDER, FOR SOLUTION ORAL DAILY
Qty: 235 G | Refills: 1 | Status: ON HOLD | OUTPATIENT
Start: 2022-06-27 | End: 2023-04-12 | Stop reason: ALTCHOICE

## 2022-06-27 RX ORDER — DOCUSATE SODIUM 100 MG/1
100 CAPSULE, LIQUID FILLED ORAL 2 TIMES DAILY
Qty: 180 CAPSULE | Refills: 3 | Status: ON HOLD | OUTPATIENT
Start: 2022-06-27 | End: 2023-04-12 | Stop reason: ALTCHOICE

## 2022-06-27 NOTE — PROGRESS NOTES
Johanny Freeman DNP   [address] [place] [address]     PATIENT NAME: Jing Hanson  : 1995  DATE: 22  MRN: [id number]      Billing Provider: Johanny Freeman DNP  Level of Service:   Patient PCP Information     Provider PCP Type    Johanny Freeman DNP General          Reason for Visit / Chief Complaint: Abdominal Pain       Update PCP  Update Chief Complaint         History of Present Illness / Problem Focused Workflow     Jing Hanson presents to the clinic with Abdominal Pain     HPI    Review of Systems     Review of Systems     Medical / Social / Family History     Past Medical History:   Diagnosis Date    Abnormal Pap smear of cervix        Past Surgical History:   Procedure Laterality Date    APPENDECTOMY         Social History  Ms.  reports that she has never smoked. She has never used smokeless tobacco. She reports that she does not drink alcohol and does not use drugs.    Family History  Ms.'s family history is not on file.    Medications and Allergies     Medications  Outpatient Medications Marked as Taking for the 22 encounter (Office Visit) with Johanny Freeman DNP   Medication Sig Dispense Refill    ergocalciferol (ERGOCALCIFEROL) 50,000 unit Cap Take 1 capsule (50,000 Units total) by mouth every 7 days. 5 capsule 4    iron,fm,ps/folic/B,C18/L.casei (FUSION PLUS ORAL) Take by mouth 2 (two) times daily with meals.         Allergies  Review of patient's allergies indicates:  No Known Allergies    Physical Examination     Vitals:    22 1049   BP: 129/84   Pulse: 78     Physical Exam  Vitals and nursing note reviewed.   HENT:      Head: Normocephalic.      Nose: Nose normal.      Mouth/Throat:      Mouth: Mucous membranes are moist.   Eyes:      Extraocular Movements: Extraocular movements intact.      Conjunctiva/sclera: Conjunctivae normal.      Pupils: Pupils are equal, round, and reactive to light.   Cardiovascular:      Rate and Rhythm: Normal rate  and regular rhythm.      Pulses: Normal pulses.      Heart sounds: Normal heart sounds.   Pulmonary:      Effort: Pulmonary effort is normal.      Breath sounds: Normal breath sounds.   Abdominal:      Tenderness: There is abdominal tenderness.   Musculoskeletal:      Cervical back: Normal range of motion.   Skin:     General: Skin is warm and dry.      Capillary Refill: Capillary refill takes less than 2 seconds.   Neurological:      General: No focal deficit present.      Mental Status: She is alert and oriented to person, place, and time. Mental status is at baseline.   Psychiatric:         Mood and Affect: Mood normal.         Behavior: Behavior normal.         Thought Content: Thought content normal.         Judgment: Judgment normal.          Assessment and Plan (including Health Maintenance)      Problem List  Smart Sets  Document Outside HM   :    Plan:         There are no preventive care reminders to display for this patient.    Problem List Items Addressed This Visit        Endocrine    BMI 35.0-35.9,adult       GI    Abdominal pain    Relevant Orders    X-Ray KUB    CBC Auto Differential    Comprehensive Metabolic Panel    Constipation - Primary          Health Maintenance Topics with due status: Not Due       Topic Last Completion Date    Influenza Vaccine Not Due       Future Appointments   Date Time Provider Department Center   10/11/2022  8:15 AM Johanny Freeman DNP [facility] [place]            Signature:  Johanny Freeman DNP      [address] [place] [address]    Date of encounter: 6/27/22

## 2022-06-27 NOTE — LETTER
June 27, 2022    Jing MIRANDA [address]  [facility] [address]             [facility]  Family Medicine  [address] [place] [address]  Phone: [phone]  Fax: [phone]   June 27, 2022     Patient: Jing Hanson   YOB: 1995   Date of Visit: 6/27/2022       To Whom it May Concern:    Jing Hanson was seen in my clinic on 6/27/2022. She may return to work on 06/28/2022.    Please excuse her from any classes or work missed.    If you have any questions or concerns, please don't hesitate to call.    Sincerely,         Johanny Freeman, DNP

## 2022-07-01 ENCOUNTER — OFFICE VISIT (OUTPATIENT)
Dept: FAMILY MEDICINE | Facility: CLINIC | Age: 27
End: 2022-07-01
Payer: MEDICAID

## 2022-07-01 VITALS
RESPIRATION RATE: 18 BRPM | DIASTOLIC BLOOD PRESSURE: 74 MMHG | HEIGHT: 68 IN | BODY MASS INDEX: 38.44 KG/M2 | HEART RATE: 69 BPM | SYSTOLIC BLOOD PRESSURE: 117 MMHG | WEIGHT: 253.63 LBS

## 2022-07-01 DIAGNOSIS — Z20.822 EXPOSURE TO COVID-19 VIRUS: Primary | ICD-10-CM

## 2022-07-01 DIAGNOSIS — R50.9 FEVER, UNSPECIFIED FEVER CAUSE: ICD-10-CM

## 2022-07-01 LAB
CTP QC/QA: YES
FLUAV AG NPH QL: NEGATIVE
FLUBV AG NPH QL: NEGATIVE
SARS-COV-2 AG RESP QL IA.RAPID: NEGATIVE

## 2022-07-01 PROCEDURE — 87428 POCT SARS-COV2 (COVID) WITH FLU ANTIGEN: ICD-10-PCS | Mod: QW,,, | Performed by: NURSE PRACTITIONER

## 2022-07-01 PROCEDURE — 99213 OFFICE O/P EST LOW 20 MIN: CPT | Mod: ,,, | Performed by: NURSE PRACTITIONER

## 2022-07-01 PROCEDURE — 87428 SARSCOV & INF VIR A&B AG IA: CPT | Mod: QW,,, | Performed by: NURSE PRACTITIONER

## 2022-07-01 PROCEDURE — 99213 PR OFFICE/OUTPT VISIT, EST, LEVL III, 20-29 MIN: ICD-10-PCS | Mod: ,,, | Performed by: NURSE PRACTITIONER

## 2022-07-01 RX ORDER — AZITHROMYCIN 250 MG/1
TABLET, FILM COATED ORAL
Qty: 6 TABLET | Refills: 0 | Status: ON HOLD | OUTPATIENT
Start: 2022-07-01 | End: 2023-04-12 | Stop reason: ALTCHOICE

## 2022-07-01 RX ORDER — METHYLPREDNISOLONE 4 MG/1
TABLET ORAL
Qty: 21 TABLET | Refills: 0 | Status: ON HOLD | OUTPATIENT
Start: 2022-07-01 | End: 2023-04-12 | Stop reason: ALTCHOICE

## 2022-07-01 NOTE — LETTER
July 1, 2022      [facility]  [address] [place] [address]  Phone: [phone]  Fax: [phone]       Patient: Jing Hanson   YOB: 1995  Date of Visit: 07/01/2022    To Whom It May Concern:    Conor Hanson  was at [facility] on 07/01/2022. The patient may return to work/school on 7/8/2022 with no restrictions. If you have any questions or concerns, or if I can be of further assistance, please do not hesitate to contact me.    Sincerely,    Johanny Freeman, DNP

## 2022-07-01 NOTE — PROGRESS NOTES
Johanny Freeman DNP   [address] [place] [address]     PATIENT NAME: Jing Hanson  : 1995  DATE: 22  MRN: [id number]      Billing Provider: Johanny Freeman DNP  Level of Service:   Patient PCP Information     Provider PCP Type    Johanny Freeman DNP General          Reason for Visit / Chief Complaint: Generalized Body Aches, Sore Throat, Nasal Congestion, and Diarrhea       Update PCP  Update Chief Complaint         History of Present Illness / Problem Focused Workflow     Jing Hanson presents to the clinic with Generalized Body Aches, Sore Throat, Nasal Congestion, and Diarrhea     HPI    Review of Systems     Review of Systems     Medical / Social / Family History     Past Medical History:   Diagnosis Date    Abnormal Pap smear of cervix        Past Surgical History:   Procedure Laterality Date    APPENDECTOMY         Social History  Ms.  reports that she has never smoked. She has never used smokeless tobacco. She reports that she does not drink alcohol and does not use drugs.    Family History  Ms.'s family history is not on file.    Medications and Allergies     Medications  Outpatient Medications Marked as Taking for the 22 encounter (Office Visit) with Johanny Freeman DNP   Medication Sig Dispense Refill    ergocalciferol (ERGOCALCIFEROL) 50,000 unit Cap Take 1 capsule (50,000 Units total) by mouth every 7 days. 5 capsule 4    iron,fm,ps/folic/B,C18/L.casei (FUSION PLUS ORAL) Take by mouth 2 (two) times daily with meals.         Allergies  Review of patient's allergies indicates:  No Known Allergies    Physical Examination     Vitals:    22 0938   BP: 117/74   Pulse: 69   Resp: 18     Physical Exam  Vitals and nursing note reviewed.   HENT:      Head: Normocephalic.      Nose: Congestion and rhinorrhea present.      Mouth/Throat:      Mouth: Mucous membranes are moist.   Eyes:      Extraocular Movements: Extraocular movements intact.       Conjunctiva/sclera: Conjunctivae normal.      Pupils: Pupils are equal, round, and reactive to light.   Cardiovascular:      Rate and Rhythm: Normal rate and regular rhythm.      Pulses: Normal pulses.      Heart sounds: Normal heart sounds.   Pulmonary:      Effort: Pulmonary effort is normal.      Breath sounds: Normal breath sounds.   Musculoskeletal:      Cervical back: Normal range of motion.   Skin:     General: Skin is warm and dry.      Capillary Refill: Capillary refill takes less than 2 seconds.   Neurological:      General: No focal deficit present.      Mental Status: She is alert and oriented to person, place, and time. Mental status is at baseline.   Psychiatric:         Mood and Affect: Mood normal.         Behavior: Behavior normal.         Thought Content: Thought content normal.         Judgment: Judgment normal.          Assessment and Plan (including Health Maintenance)      Problem List  Smart [employer]  Document Outside HM   :    Plan:         Health Maintenance Due   Topic Date Due    Pap Smear  01/03/2021       Problem List Items Addressed This Visit        ID    Exposure to COVID-19 virus - Primary       Endocrine    BMI 35.0-35.9,adult       Other    Fever    Relevant Orders    POCT SARS-COV2 (COVID) with Flu Antigen (Completed)          Health Maintenance Topics with due status: Not Due       Topic Last Completion Date    Influenza Vaccine Not Due       Future Appointments   Date Time Provider Department Center   10/11/2022  8:15 AM Johanny Freeman DNP [facility] [place]            Signature:  Johanny Freeman DNP      [address] [place] [address]    Date of encounter: 7/1/22

## 2022-07-11 PROBLEM — Z13.6 ENCOUNTER FOR SCREENING FOR CARDIOVASCULAR DISORDERS: Status: RESOLVED | Noted: 2022-04-11 | Resolved: 2022-07-11

## 2022-09-21 ENCOUNTER — OFFICE VISIT (OUTPATIENT)
Dept: FAMILY MEDICINE | Facility: CLINIC | Age: 27
End: 2022-09-21
Payer: MEDICAID

## 2022-09-21 VITALS
OXYGEN SATURATION: 99 % | HEART RATE: 85 BPM | TEMPERATURE: 98 F | SYSTOLIC BLOOD PRESSURE: 125 MMHG | WEIGHT: 261.63 LBS | RESPIRATION RATE: 18 BRPM | HEIGHT: 68 IN | BODY MASS INDEX: 39.65 KG/M2 | DIASTOLIC BLOOD PRESSURE: 87 MMHG

## 2022-09-21 DIAGNOSIS — R05.9 COUGH: ICD-10-CM

## 2022-09-21 DIAGNOSIS — R50.9 FEVER, UNSPECIFIED FEVER CAUSE: ICD-10-CM

## 2022-09-21 DIAGNOSIS — J32.9 SINUSITIS, UNSPECIFIED CHRONICITY, UNSPECIFIED LOCATION: Primary | ICD-10-CM

## 2022-09-21 LAB
CTP QC/QA: YES
CTP QC/QA: YES
FLUAV AG NPH QL: NEGATIVE
FLUBV AG NPH QL: NEGATIVE
S PYO RRNA THROAT QL PROBE: NEGATIVE
SARS-COV-2 AG RESP QL IA.RAPID: NEGATIVE

## 2022-09-21 PROCEDURE — 87880 STREP A ASSAY W/OPTIC: CPT | Mod: QW,,, | Performed by: NURSE PRACTITIONER

## 2022-09-21 PROCEDURE — 87880 POCT RAPID STREP A: ICD-10-PCS | Mod: QW,,, | Performed by: NURSE PRACTITIONER

## 2022-09-21 PROCEDURE — 99213 PR OFFICE/OUTPT VISIT, EST, LEVL III, 20-29 MIN: ICD-10-PCS | Mod: ,,, | Performed by: NURSE PRACTITIONER

## 2022-09-21 PROCEDURE — 87428 SARSCOV & INF VIR A&B AG IA: CPT | Mod: QW,,, | Performed by: NURSE PRACTITIONER

## 2022-09-21 PROCEDURE — 87428 POCT SARS-COV2 (COVID) WITH FLU ANTIGEN: ICD-10-PCS | Mod: QW,,, | Performed by: NURSE PRACTITIONER

## 2022-09-21 PROCEDURE — 99213 OFFICE O/P EST LOW 20 MIN: CPT | Mod: ,,, | Performed by: NURSE PRACTITIONER

## 2022-09-21 RX ORDER — FLUTICASONE PROPIONATE 50 MCG
1 SPRAY, SUSPENSION (ML) NASAL DAILY
Qty: 11.1 ML | Refills: 0 | Status: ON HOLD | OUTPATIENT
Start: 2022-09-21 | End: 2023-04-12 | Stop reason: ALTCHOICE

## 2022-09-21 RX ORDER — LEVOCETIRIZINE DIHYDROCHLORIDE 5 MG/1
5 TABLET, FILM COATED ORAL NIGHTLY
Qty: 30 TABLET | Refills: 1 | Status: ON HOLD | OUTPATIENT
Start: 2022-09-21 | End: 2023-04-12 | Stop reason: ALTCHOICE

## 2022-09-21 RX ORDER — CEFDINIR 300 MG/1
300 CAPSULE ORAL 2 TIMES DAILY
Qty: 14 CAPSULE | Refills: 0 | Status: SHIPPED | OUTPATIENT
Start: 2022-09-21 | End: 2022-09-28

## 2022-09-21 NOTE — PROGRESS NOTES
"   Johanny Freeman DNP   [address] [place] [address]     PATIENT NAME: Jing Hanson  : 1995  DATE: 22  MRN: [id number]      Billing Provider: Johanny Freeman DNP  Level of Service:   Patient PCP Information       Provider PCP Type    Johanny Freeman DNP General            Reason for Visit / Chief Complaint: Nasal Congestion and Sore Throat       Update PCP  Update Chief Complaint         History of Present Illness / Problem Focused Workflow     Jing Hanson presents to the clinic with Nasal Congestion and Sore Throat     Sore Throat     Review of Systems     Review of Systems   HENT:  Positive for sore throat.       Medical / Social / Family History     Past Medical History:   Diagnosis Date    Abnormal Pap smear of cervix        Past Surgical History:   Procedure Laterality Date    APPENDECTOMY         Social History  Ms.  reports that she has never smoked. She has never used smokeless tobacco. She reports that she does not drink alcohol and does not use drugs.    Family History  Ms.'s family history is not on file.    Medications and Allergies     Medications  No outpatient medications have been marked as taking for the 22 encounter (Office Visit) with Johanny Freeman DNP.       Allergies  Review of patient's allergies indicates:  No Known Allergies    Physical Examination   /87 (BP Location: Left arm, Patient Position: Sitting, BP Method: Large (Automatic))   Pulse 85   Temp 98.1 °F (36.7 °C) (Oral)   Resp 18   Ht 5' 8" (1.727 m)   Wt 118.7 kg (261 lb 9.6 oz)   SpO2 99%   BMI 39.78 kg/m²    Physical Exam  Vitals and nursing note reviewed.   HENT:      Head: Normocephalic.      Ears:      Comments: Dull Tm with fluid     Nose: Congestion and rhinorrhea present.      Mouth/Throat:      Mouth: Mucous membranes are moist.      Pharynx: Posterior oropharyngeal erythema present.   Eyes:      Extraocular Movements: Extraocular movements intact.      " Conjunctiva/sclera: Conjunctivae normal.      Pupils: Pupils are equal, round, and reactive to light.   Cardiovascular:      Rate and Rhythm: Normal rate and regular rhythm.      Pulses: Normal pulses.      Heart sounds: Normal heart sounds.   Pulmonary:      Effort: Pulmonary effort is normal.      Breath sounds: Normal breath sounds.   Musculoskeletal:      Cervical back: Normal range of motion.   Lymphadenopathy:      Cervical: Cervical adenopathy present.   Skin:     General: Skin is warm and dry.      Capillary Refill: Capillary refill takes less than 2 seconds.   Neurological:      General: No focal deficit present.      Mental Status: She is alert and oriented to person, place, and time. Mental status is at baseline.   Psychiatric:         Mood and Affect: Mood normal.         Behavior: Behavior normal.         Thought Content: Thought content normal.         Judgment: Judgment normal.        Assessment and Plan (including Health Maintenance)      Problem List  Smart [employer]  Document Outside HM   :    Plan:         Health Maintenance Due   Topic Date Due    Pap Smear  01/03/2021    Influenza Vaccine (1) Never done       Problem List Items Addressed This Visit          ENT    Sinusitis - Primary       Pulmonary    Cough       Endocrine    BMI 35.0-35.9,adult       Other    Fever    Relevant Orders    POCT SARS-COV2 (COVID) with Flu Antigen (Completed)    POCT rapid strep A (Completed)       The patient has no Health Maintenance topics of status Not Due    Future Appointments   Date Time Provider Department Center   10/11/2022  8:15 AM Johanny Freeman DNP [facility]            Signature:  Johanny Freeman DNP      [address] [place] [address]    Date of encounter: 9/21/22

## 2022-10-11 ENCOUNTER — OFFICE VISIT (OUTPATIENT)
Dept: FAMILY MEDICINE | Facility: CLINIC | Age: 27
End: 2022-10-11
Payer: MEDICAID

## 2022-10-11 VITALS
WEIGHT: 260.38 LBS | TEMPERATURE: 98 F | HEART RATE: 86 BPM | SYSTOLIC BLOOD PRESSURE: 119 MMHG | BODY MASS INDEX: 39.46 KG/M2 | RESPIRATION RATE: 18 BRPM | OXYGEN SATURATION: 99 % | HEIGHT: 68 IN | DIASTOLIC BLOOD PRESSURE: 80 MMHG

## 2022-10-11 DIAGNOSIS — Z01.419 ENCNTR FOR GYN EXAM (GENERAL) (ROUTINE) W/O ABN FINDINGS: Primary | ICD-10-CM

## 2022-10-11 DIAGNOSIS — Z12.4 SCREENING FOR MALIGNANT NEOPLASM OF CERVIX: ICD-10-CM

## 2022-10-11 DIAGNOSIS — Z11.3 SCREEN FOR STD (SEXUALLY TRANSMITTED DISEASE): ICD-10-CM

## 2022-10-11 DIAGNOSIS — N89.8 VAGINAL DISCHARGE: ICD-10-CM

## 2022-10-11 PROCEDURE — 87661 TRICHOMONAS VAGINALIS BY PCR: ICD-10-PCS | Mod: ,,, | Performed by: CLINICAL MEDICAL LABORATORY

## 2022-10-11 PROCEDURE — 87491 CHLMYD TRACH DNA AMP PROBE: CPT | Mod: ,,, | Performed by: CLINICAL MEDICAL LABORATORY

## 2022-10-11 PROCEDURE — 90471 IMMUNIZATION ADMIN: CPT | Mod: ,,, | Performed by: NURSE PRACTITIONER

## 2022-10-11 PROCEDURE — 99395 PR PREVENTIVE VISIT,EST,18-39: ICD-10-PCS | Mod: 25,,, | Performed by: NURSE PRACTITIONER

## 2022-10-11 PROCEDURE — 87661 TRICHOMONAS VAGINALIS AMPLIF: CPT | Mod: ,,, | Performed by: CLINICAL MEDICAL LABORATORY

## 2022-10-11 PROCEDURE — 87591 CHLAMYDIA/GONORRHOEAE(GC), PCR: ICD-10-PCS | Mod: ,,, | Performed by: CLINICAL MEDICAL LABORATORY

## 2022-10-11 PROCEDURE — 87491 CHLAMYDIA/GONORRHOEAE(GC), PCR: ICD-10-PCS | Mod: ,,, | Performed by: CLINICAL MEDICAL LABORATORY

## 2022-10-11 PROCEDURE — 87210 WET PREP, GENITAL: ICD-10-PCS | Mod: 59,,, | Performed by: CLINICAL MEDICAL LABORATORY

## 2022-10-11 PROCEDURE — 88142 CYTOPATH C/V THIN LAYER: CPT | Mod: GCY | Performed by: NURSE PRACTITIONER

## 2022-10-11 PROCEDURE — 90686 IIV4 VACC NO PRSV 0.5 ML IM: CPT | Mod: ,,, | Performed by: NURSE PRACTITIONER

## 2022-10-11 PROCEDURE — 87591 N.GONORRHOEAE DNA AMP PROB: CPT | Mod: ,,, | Performed by: CLINICAL MEDICAL LABORATORY

## 2022-10-11 PROCEDURE — 90686 FLU VACCINE (QUAD) GREATER THAN OR EQUAL TO 3YO PRESERVATIVE FREE IM: ICD-10-PCS | Mod: ,,, | Performed by: NURSE PRACTITIONER

## 2022-10-11 PROCEDURE — 90471 FLU VACCINE (QUAD) GREATER THAN OR EQUAL TO 3YO PRESERVATIVE FREE IM: ICD-10-PCS | Mod: ,,, | Performed by: NURSE PRACTITIONER

## 2022-10-11 PROCEDURE — 87210 SMEAR WET MOUNT SALINE/INK: CPT | Mod: 59,,, | Performed by: CLINICAL MEDICAL LABORATORY

## 2022-10-11 PROCEDURE — 99395 PREV VISIT EST AGE 18-39: CPT | Mod: 25,,, | Performed by: NURSE PRACTITIONER

## 2022-10-11 NOTE — PROGRESS NOTES
SUBJECTIVE:   26 y.o. female for annual routine Pap and checkup.    Denies urinary ss   Denies family hx of reproductive cancers  Request STD testing.       Current Outpatient Medications   Medication Sig Dispense Refill    azithromycin (Z-WILLIAM) 250 MG tablet Take 2 tablets by mouth on day 1; Take 1 tablet by mouth on days 2-5 6 tablet 0    azithromycin (Z-WILLIAM) 250 MG tablet Take 2 tablets by mouth on day 1; Take 1 tablet by mouth on days 2-5 6 tablet 0    docusate sodium (COLACE) 100 MG capsule Take 1 capsule (100 mg total) by mouth 2 (two) times daily. 180 capsule 3    ergocalciferol (ERGOCALCIFEROL) 50,000 unit Cap Take 1 capsule (50,000 Units total) by mouth every 7 days. (Patient not taking: Reported on 10/11/2022) 5 capsule 4    fluconazole (DIFLUCAN) 150 MG Tab TAKE 1 TABLET BY MOUTH NOW, THEN REPEAT ON DAY 3 1 tablet 0    fluticasone propionate (FLONASE) 50 mcg/actuation nasal spray 1 spray (50 mcg total) by Each Nostril route once daily. 11.1 mL 0    iron,fm,ps/folic/B,C18/L.casei (FUSION PLUS ORAL) Take by mouth 2 (two) times daily with meals.      levocetirizine (XYZAL) 5 MG tablet Take 1 tablet (5 mg total) by mouth every evening. (Patient not taking: Reported on 10/11/2022) 30 tablet 1    methylPREDNISolone (MEDROL DOSEPACK) 4 mg tablet use as directed 21 tablet 0    methylPREDNISolone (MEDROL DOSEPACK) 4 mg tablet use as directed 21 tablet 0    metroNIDAZOLE (FLAGYL) 500 MG tablet Take 1 tablet (500 mg total) by mouth every 12 (twelve) hours. 14 tablet 0    plecanatide (TRULANCE) 3 mg Tab Take 3 mg by mouth once daily. (Patient not taking: Reported on 10/11/2022) 90 tablet 1    polyethylene glycol (GLYCOLAX) 17 gram/dose powder Take 17 g by mouth once daily. (Patient not taking: No sig reported) 235 g 1     No current facility-administered medications for this visit.     Allergies: Patient has no known allergies.   Patient's last menstrual period was 10/02/2022.    ROS:  Feeling well. No dyspnea or  "chest pain on exertion.  No abdominal pain, change in bowel habits, black or bloody stools.  No urinary tract symptoms. GYN ROS: normal menses, no abnormal bleeding, pelvic pain or discharge, no breast pain or new or enlarging lumps on self exam. No neurological complaints.    OBJECTIVE:   The patient appears well, alert, oriented x 3, in no distress.  /80 (BP Location: Left arm, Patient Position: Sitting, BP Method: Large (Automatic))   Pulse 86   Temp 97.6 °F (36.4 °C) (Oral)   Resp 18   Ht 5' 8" (1.727 m)   Wt 118.1 kg (260 lb 6.4 oz)   LMP 10/02/2022 Comment: tubaligation  SpO2 99%   BMI 39.59 kg/m²   ENT normal.  Neck supple. No adenopathy or thyromegaly. BERENICE. Lungs are clear, good air entry, no wheezes, rhonchi or rales. S1 and S2 normal, no murmurs, regular rate and rhythm. Abdomen soft without tenderness, guarding, mass or organomegaly. Extremities show no edema, normal peripheral pulses. Neurological is normal, no focal findings.    BREAST EXAM: breasts appear normal, no suspicious masses, no skin or nipple changes or axillary nodes    PELVIC EXAM: normal external genitalia, vulva, vagina, cervix, uterus and adnexa, VULVA: normal appearing vulva with no masses, tenderness or lesions, VAGINA: normal appearing vagina with normal color and discharge, no lesions, vaginal discharge - white, copious, and creamy, CERVIX: normal appearing cervix without discharge or lesions, UTERUS: uterus is normal size, shape, consistency and nontender, ADNEXA: normal adnexa in size, nontender and no masses, PAP: Pap smear done today, exam chaperoned by MIGUEL A Harding    ASSESSMENT:   well woman    PLAN:   pap smear  return annually or prn  Mmg at 43yo  Flu vacc today      "

## 2022-10-12 ENCOUNTER — OFFICE VISIT (OUTPATIENT)
Dept: FAMILY MEDICINE | Facility: CLINIC | Age: 27
End: 2022-10-12
Payer: MEDICAID

## 2022-10-12 VITALS
WEIGHT: 258.38 LBS | HEIGHT: 68 IN | BODY MASS INDEX: 39.16 KG/M2 | HEART RATE: 110 BPM | TEMPERATURE: 98 F | SYSTOLIC BLOOD PRESSURE: 134 MMHG | RESPIRATION RATE: 18 BRPM | DIASTOLIC BLOOD PRESSURE: 88 MMHG | OXYGEN SATURATION: 96 %

## 2022-10-12 DIAGNOSIS — A74.9 CHLAMYDIA: Primary | ICD-10-CM

## 2022-10-12 DIAGNOSIS — N89.8 VAGINAL DISCHARGE: ICD-10-CM

## 2022-10-12 LAB
CHLAMYDIA BY PCR: POSITIVE
N. GONORRHOEAE (GC) BY PCR: NEGATIVE
TRICHOMONAS NAT: NEGATIVE

## 2022-10-12 PROCEDURE — 99212 PR OFFICE/OUTPT VISIT, EST, LEVL II, 10-19 MIN: ICD-10-PCS | Mod: ,,, | Performed by: NURSE PRACTITIONER

## 2022-10-12 PROCEDURE — 99212 OFFICE O/P EST SF 10 MIN: CPT | Mod: ,,, | Performed by: NURSE PRACTITIONER

## 2022-10-12 RX ORDER — AZITHROMYCIN 1 G/1
1 POWDER, FOR SUSPENSION ORAL ONCE
Qty: 1 PACKET | Refills: 0 | Status: SHIPPED | OUTPATIENT
Start: 2022-10-12 | End: 2022-10-12

## 2022-10-12 NOTE — TELEPHONE ENCOUNTER
----- Message from Johanny Freeman DNP sent at 10/12/2022  8:17 AM CDT -----  Needs to come in asap to discuss results. Needs Azithromycin 1g. Total at once.

## 2022-10-12 NOTE — PROGRESS NOTES
"Jing Hanson is a 26 y.o. female No obstetric history on file. presents with complaint of vaginal discharge for not noted.  She denies itching.  denies odor.  She states the discharge is clear.  Patient's last menstrual period was 10/02/2022..    Past Medical History:   Diagnosis Date    Abnormal Pap smear of cervix      Past Surgical History:   Procedure Laterality Date    APPENDECTOMY      TUBAL LIGATION       OB History   No obstetric history on file.         ROS:  GENERAL: No fever, chills, fatigability or weight loss.  ABDOMEN: No abdominal pain. Denies nausea. Denies vomiting. No diarrhea. No constipation  BREAST: Denies pain. No lumps. No discharge.  URINARY: No incontinence, no nocturia, no frequency and no dysuria.  CARDIOVASCULAR: No chest pain. No shortness of breath. No leg cramps.  NEUROLOGICAL: No headaches. No vision changes.  : See HPI    Vitals:    10/12/22 1304   BP: 134/88   Pulse: 110   Resp: 18   Temp: 98.2 °F (36.8 °C)   TempSrc: Oral   SpO2: 96%   Weight: 117.2 kg (258 lb 6.4 oz)   Height: 5' 8" (1.727 m)         PHYSICAL EXAM:   General : normal appearance  Resp: clear bilaterally  Cards: s1, s2, no murmurs.  GI: soft, nontender, nondistended  : discharge, odor  Vagina- without lesions, with discharge, without erythema, without ulcers        ASSESSMENT and PLAN:  Chlamydia    BMI 35.0-35.9,adult    Vaginal discharge        Pending labs  Sent medication  F/u 1 month for repeat swabs  Had pap and std testing that came back +chlamydia. Discussed partner getting tested and treated. No intercourse x7 days until both finished therapy.       FOLLOW UP: PRN lack of improvement.    "

## 2022-10-13 LAB
GH SERPL-MCNC: NORMAL NG/ML
INSULIN SERPL-ACNC: NORMAL U[IU]/ML
LAB AP CLINICAL INFORMATION: NORMAL
LAB AP GYN INTERPRETATION: NEGATIVE
LAB AP PAP DISCLAIMER COMMENTS: NORMAL
RENIN PLAS-CCNC: NORMAL NG/ML/H

## 2023-01-20 ENCOUNTER — OFFICE VISIT (OUTPATIENT)
Dept: FAMILY MEDICINE | Facility: CLINIC | Age: 28
End: 2023-01-20
Payer: MEDICAID

## 2023-01-20 VITALS
TEMPERATURE: 98 F | HEART RATE: 83 BPM | WEIGHT: 267.63 LBS | OXYGEN SATURATION: 97 % | HEIGHT: 68 IN | SYSTOLIC BLOOD PRESSURE: 127 MMHG | BODY MASS INDEX: 40.56 KG/M2 | DIASTOLIC BLOOD PRESSURE: 85 MMHG | RESPIRATION RATE: 18 BRPM

## 2023-01-20 DIAGNOSIS — R11.0 NAUSEA: ICD-10-CM

## 2023-01-20 DIAGNOSIS — R05.1 ACUTE COUGH: ICD-10-CM

## 2023-01-20 DIAGNOSIS — G44.209 ACUTE NON INTRACTABLE TENSION-TYPE HEADACHE: Primary | ICD-10-CM

## 2023-01-20 LAB
CTP QC/QA: YES
SARS-COV-2 AG RESP QL IA.RAPID: NEGATIVE

## 2023-01-20 PROCEDURE — 3074F PR MOST RECENT SYSTOLIC BLOOD PRESSURE < 130 MM HG: ICD-10-PCS | Mod: ,,, | Performed by: NURSE PRACTITIONER

## 2023-01-20 PROCEDURE — 3074F SYST BP LT 130 MM HG: CPT | Mod: ,,, | Performed by: NURSE PRACTITIONER

## 2023-01-20 PROCEDURE — 3008F PR BODY MASS INDEX (BMI) DOCUMENTED: ICD-10-PCS | Mod: ,,, | Performed by: NURSE PRACTITIONER

## 2023-01-20 PROCEDURE — 99213 PR OFFICE/OUTPT VISIT, EST, LEVL III, 20-29 MIN: ICD-10-PCS | Mod: ,,, | Performed by: NURSE PRACTITIONER

## 2023-01-20 PROCEDURE — 99213 OFFICE O/P EST LOW 20 MIN: CPT | Mod: ,,, | Performed by: NURSE PRACTITIONER

## 2023-01-20 PROCEDURE — 3008F BODY MASS INDEX DOCD: CPT | Mod: ,,, | Performed by: NURSE PRACTITIONER

## 2023-01-20 PROCEDURE — 87426 SARSCOV CORONAVIRUS AG IA: CPT | Mod: QW,,, | Performed by: NURSE PRACTITIONER

## 2023-01-20 PROCEDURE — 87426 SARS CORONAVIRUS 2 ANTIGEN POCT: ICD-10-PCS | Mod: QW,,, | Performed by: NURSE PRACTITIONER

## 2023-01-20 RX ORDER — ONDANSETRON 4 MG/1
4 TABLET, ORALLY DISINTEGRATING ORAL 2 TIMES DAILY
Qty: 60 TABLET | Refills: 0 | Status: ON HOLD | OUTPATIENT
Start: 2023-01-20 | End: 2023-04-12 | Stop reason: ALTCHOICE

## 2023-01-20 NOTE — PROGRESS NOTES
Jemma Dumont NP   [address] [place] [address]     PATIENT NAME: Jing Hanson  : 1995  DATE: 23  MRN: [id number]      Billing Provider: Jemma Dumont NP  Level of Service: HI OFFICE/OUTPT VISIT, EST, LEVL III, 20-29 MIN  Patient PCP Information       Provider PCP Type    Johanny Freeman DNP General            Reason for Visit / Chief Complaint: Headache and Abdominal Pain       Update PCP  Update Chief Complaint         History of Present Illness / Problem Focused Workflow     Jing Hanson presents to the clinic with Headache and Abdominal Pain     Headache   This is a new problem. The current episode started in the past 7 days. The pain is located in the Frontal region. The pain does not radiate. The pain quality is not similar to prior headaches. The quality of the pain is described as aching. Associated symptoms include abdominal pain. She has tried acetaminophen for the symptoms. The treatment provided mild relief.   Abdominal Pain  Associated symptoms include headaches.     Review of Systems     Review of Systems   Gastrointestinal:  Positive for abdominal pain.   Neurological:  Positive for headaches.   All other systems reviewed and are negative.     Medical / Social / Family History     Past Medical History:   Diagnosis Date    Abnormal Pap smear of cervix        Past Surgical History:   Procedure Laterality Date    APPENDECTOMY      TUBAL LIGATION         Social History  Ms.  reports that she has never smoked. She has never used smokeless tobacco. She reports current alcohol use. She reports that she does not use drugs.    Family History  Ms.'s family history includes Diabetes in her father and mother; Epilepsy in her mother; Hypertension in her father and mother.    Medications and Allergies     Medications  No outpatient medications have been marked as taking for the 23 encounter (Office Visit) with Jemma Dumont NP.  "      Allergies  Review of patient's allergies indicates:  No Known Allergies    Physical Examination   /85 (BP Location: Left arm, Patient Position: Sitting, BP Method: Medium (Automatic))   Pulse 83   Temp 97.7 °F (36.5 °C) (Oral)   Resp 18   Ht 5' 8" (1.727 m)   Wt 121.4 kg (267 lb 9.6 oz)   SpO2 97%   BMI 40.69 kg/m²    Physical Exam  Vitals and nursing note reviewed.   Constitutional:       Appearance: Normal appearance. She is obese.   HENT:      Head: Normocephalic.      Right Ear: Tympanic membrane normal.      Left Ear: Tympanic membrane normal.      Nose: Nose normal.      Mouth/Throat:      Mouth: Mucous membranes are moist.      Pharynx: Oropharynx is clear.   Eyes:      Conjunctiva/sclera: Conjunctivae normal.      Pupils: Pupils are equal, round, and reactive to light.   Cardiovascular:      Rate and Rhythm: Normal rate and regular rhythm.      Pulses: Normal pulses.      Heart sounds: Normal heart sounds.   Pulmonary:      Effort: Pulmonary effort is normal.      Breath sounds: Normal breath sounds.   Abdominal:      General: Bowel sounds are normal.      Palpations: Abdomen is soft.   Musculoskeletal:         General: Normal range of motion.      Cervical back: Normal range of motion and neck supple.   Skin:     General: Skin is warm.      Capillary Refill: Capillary refill takes less than 2 seconds.   Neurological:      General: No focal deficit present.      Mental Status: She is alert and oriented to person, place, and time.   Psychiatric:         Mood and Affect: Mood normal.         Thought Content: Thought content normal.        Assessment and Plan (including Health Maintenance)      Problem List  Smart [employer]  Document Outside HM   :    Plan:     RTC next week for KUB -- take tylenol for headache    There are no preventive care reminders to display for this patient.    Problem List Items Addressed This Visit          Pulmonary    Cough    Relevant Orders    SARS Coronavirus 2 " Antigen, POCT (Completed)     Other Visit Diagnoses       Acute non intractable tension-type headache    -  Primary    Nausea        Relevant Medications    ondansetron (ZOFRAN-ODT) 4 MG TbDL            Health Maintenance Topics with due status: Not Due       Topic Last Completion Date    Pap Smear 10/11/2022       No future appointments.         Signature:  Jemma Dumont NP      [address] [place] [address]    Date of encounter: 1/20/23

## 2023-02-06 ENCOUNTER — APPOINTMENT (OUTPATIENT)
Dept: RADIOLOGY | Facility: CLINIC | Age: 28
End: 2023-02-06
Attending: NURSE PRACTITIONER
Payer: MEDICAID

## 2023-02-06 ENCOUNTER — OFFICE VISIT (OUTPATIENT)
Dept: FAMILY MEDICINE | Facility: CLINIC | Age: 28
End: 2023-02-06
Payer: MEDICAID

## 2023-02-06 VITALS
OXYGEN SATURATION: 99 % | HEART RATE: 93 BPM | HEIGHT: 68 IN | DIASTOLIC BLOOD PRESSURE: 76 MMHG | BODY MASS INDEX: 40.95 KG/M2 | SYSTOLIC BLOOD PRESSURE: 134 MMHG | WEIGHT: 270.19 LBS | RESPIRATION RATE: 18 BRPM | TEMPERATURE: 97 F

## 2023-02-06 DIAGNOSIS — R11.0 NAUSEA: ICD-10-CM

## 2023-02-06 DIAGNOSIS — Z13.6 ENCOUNTER FOR SCREENING FOR CARDIOVASCULAR DISORDERS: ICD-10-CM

## 2023-02-06 DIAGNOSIS — R10.11 RIGHT UPPER QUADRANT ABDOMINAL PAIN: Primary | ICD-10-CM

## 2023-02-06 DIAGNOSIS — Z11.3 SCREEN FOR STD (SEXUALLY TRANSMITTED DISEASE): ICD-10-CM

## 2023-02-06 DIAGNOSIS — R10.11 RIGHT UPPER QUADRANT ABDOMINAL PAIN: ICD-10-CM

## 2023-02-06 LAB
ALBUMIN SERPL BCP-MCNC: 3.4 G/DL (ref 3.5–5)
ALBUMIN/GLOB SERPL: 0.9 {RATIO}
ALP SERPL-CCNC: 85 U/L (ref 37–98)
ALT SERPL W P-5'-P-CCNC: 31 U/L (ref 13–56)
ANION GAP SERPL CALCULATED.3IONS-SCNC: 6 MMOL/L (ref 7–16)
AST SERPL W P-5'-P-CCNC: 21 U/L (ref 15–37)
BACTERIA VAG QL WET PREP: ABNORMAL /HPF
BASOPHILS # BLD AUTO: 0.06 K/UL (ref 0–0.2)
BASOPHILS NFR BLD AUTO: 0.6 % (ref 0–1)
BILIRUB SERPL-MCNC: 0.1 MG/DL (ref ?–1.2)
BUN SERPL-MCNC: 17 MG/DL (ref 7–18)
BUN/CREAT SERPL: 28 (ref 6–20)
CALCIUM SERPL-MCNC: 9.2 MG/DL (ref 8.5–10.1)
CHLORIDE SERPL-SCNC: 108 MMOL/L (ref 98–107)
CHOLEST SERPL-MCNC: 159 MG/DL (ref 0–200)
CHOLEST/HDLC SERPL: 4 {RATIO}
CLUE CELLS VAG QL WET PREP: ABNORMAL /HPF
CO2 SERPL-SCNC: 32 MMOL/L (ref 21–32)
CREAT SERPL-MCNC: 0.61 MG/DL (ref 0.55–1.02)
DIFFERENTIAL METHOD BLD: ABNORMAL
EGFR (NO RACE VARIABLE) (RUSH/TITUS): 127 ML/MIN/1.73M²
EOSINOPHIL # BLD AUTO: 0.25 K/UL (ref 0–0.5)
EOSINOPHIL NFR BLD AUTO: 2.4 % (ref 1–4)
ERYTHROCYTE [DISTWIDTH] IN BLOOD BY AUTOMATED COUNT: 14.7 % (ref 11.5–14.5)
GLOBULIN SER-MCNC: 3.8 G/DL (ref 2–4)
GLUCOSE SERPL-MCNC: 97 MG/DL (ref 74–106)
HCT VFR BLD AUTO: 38.1 % (ref 38–47)
HDLC SERPL-MCNC: 40 MG/DL (ref 40–60)
HGB BLD-MCNC: 11.8 G/DL (ref 12–16)
IMM GRANULOCYTES # BLD AUTO: 0.03 K/UL (ref 0–0.04)
IMM GRANULOCYTES NFR BLD: 0.3 % (ref 0–0.4)
LDLC SERPL CALC-MCNC: 93 MG/DL
LDLC/HDLC SERPL: 2.3 {RATIO}
LYMPHOCYTES # BLD AUTO: 2.17 K/UL (ref 1–4.8)
LYMPHOCYTES NFR BLD AUTO: 21 % (ref 27–41)
MCH RBC QN AUTO: 26.9 PG (ref 27–31)
MCHC RBC AUTO-ENTMCNC: 31 G/DL (ref 32–36)
MCV RBC AUTO: 86.8 FL (ref 80–96)
MONOCYTES # BLD AUTO: 0.54 K/UL (ref 0–0.8)
MONOCYTES NFR BLD AUTO: 5.2 % (ref 2–6)
MPC BLD CALC-MCNC: 11.4 FL (ref 9.4–12.4)
NEUTROPHILS # BLD AUTO: 7.29 K/UL (ref 1.8–7.7)
NEUTROPHILS NFR BLD AUTO: 70.5 % (ref 53–65)
NONHDLC SERPL-MCNC: 119 MG/DL
NRBC # BLD AUTO: 0 X10E3/UL
NRBC, AUTO (.00): 0 %
PLATELET # BLD AUTO: 312 K/UL (ref 150–400)
POTASSIUM SERPL-SCNC: 3.8 MMOL/L (ref 3.5–5.1)
PROT SERPL-MCNC: 7.2 G/DL (ref 6.4–8.2)
RBC # BLD AUTO: 4.39 M/UL (ref 4.2–5.4)
RBC #/AREA VAG WET PREP: ABNORMAL /HPF
SODIUM SERPL-SCNC: 142 MMOL/L (ref 136–145)
SQUAMOUS EPITHELIALS WET WOUNT, GENITAL: ABNORMAL /HPF
T VAGINALIS VAG QL WET PREP: ABNORMAL /HPF
TRIGL SERPL-MCNC: 131 MG/DL (ref 35–150)
UREA BREATH TEST QL: NEGATIVE
VLDLC SERPL-MCNC: 26 MG/DL
WBC # BLD AUTO: 10.34 K/UL (ref 4.5–11)
WBC CLUMPS WET MOUNT, GENITAL: ABNORMAL /HPF
WBC VAG QL WET PREP: ABNORMAL /HPF
YEAST VAG QL WET PREP: ABNORMAL /HPF

## 2023-02-06 PROCEDURE — 99213 OFFICE O/P EST LOW 20 MIN: CPT | Mod: ,,, | Performed by: NURSE PRACTITIONER

## 2023-02-06 PROCEDURE — 83013 H. PYLORI BREATH TEST: ICD-10-PCS | Mod: ,,, | Performed by: CLINICAL MEDICAL LABORATORY

## 2023-02-06 PROCEDURE — 74018 XR KUB: ICD-10-PCS | Mod: 26,,, | Performed by: RADIOLOGY

## 2023-02-06 PROCEDURE — 87661 TRICHOMONAS VAGINALIS AMPLIF: CPT | Mod: ,,, | Performed by: CLINICAL MEDICAL LABORATORY

## 2023-02-06 PROCEDURE — 87591 N.GONORRHOEAE DNA AMP PROB: CPT | Mod: ,,, | Performed by: CLINICAL MEDICAL LABORATORY

## 2023-02-06 PROCEDURE — 85025 CBC WITH DIFFERENTIAL: ICD-10-PCS | Mod: ,,, | Performed by: CLINICAL MEDICAL LABORATORY

## 2023-02-06 PROCEDURE — 80053 COMPREHENSIVE METABOLIC PANEL: ICD-10-PCS | Mod: ,,, | Performed by: CLINICAL MEDICAL LABORATORY

## 2023-02-06 PROCEDURE — 80061 LIPID PANEL: ICD-10-PCS | Mod: ,,, | Performed by: CLINICAL MEDICAL LABORATORY

## 2023-02-06 PROCEDURE — 80053 COMPREHEN METABOLIC PANEL: CPT | Mod: ,,, | Performed by: CLINICAL MEDICAL LABORATORY

## 2023-02-06 PROCEDURE — 99213 PR OFFICE/OUTPT VISIT, EST, LEVL III, 20-29 MIN: ICD-10-PCS | Mod: ,,, | Performed by: NURSE PRACTITIONER

## 2023-02-06 PROCEDURE — 87491 CHLAMYDIA/GONORRHOEAE(GC), PCR: ICD-10-PCS | Mod: ,,, | Performed by: CLINICAL MEDICAL LABORATORY

## 2023-02-06 PROCEDURE — 87661 TRICHOMONAS VAGINALIS BY PCR: ICD-10-PCS | Mod: ,,, | Performed by: CLINICAL MEDICAL LABORATORY

## 2023-02-06 PROCEDURE — 74018 RADEX ABDOMEN 1 VIEW: CPT | Mod: 26,,, | Performed by: RADIOLOGY

## 2023-02-06 PROCEDURE — 3078F PR MOST RECENT DIASTOLIC BLOOD PRESSURE < 80 MM HG: ICD-10-PCS | Mod: ,,, | Performed by: NURSE PRACTITIONER

## 2023-02-06 PROCEDURE — 83013 H PYLORI (C-13) BREATH: CPT | Mod: ,,, | Performed by: CLINICAL MEDICAL LABORATORY

## 2023-02-06 PROCEDURE — 87210 SMEAR WET MOUNT SALINE/INK: CPT | Mod: XU,,, | Performed by: CLINICAL MEDICAL LABORATORY

## 2023-02-06 PROCEDURE — 87491 CHLMYD TRACH DNA AMP PROBE: CPT | Mod: ,,, | Performed by: CLINICAL MEDICAL LABORATORY

## 2023-02-06 PROCEDURE — 3008F BODY MASS INDEX DOCD: CPT | Mod: ,,, | Performed by: NURSE PRACTITIONER

## 2023-02-06 PROCEDURE — 74018 RADEX ABDOMEN 1 VIEW: CPT | Mod: TC,RHCUB,FY | Performed by: NURSE PRACTITIONER

## 2023-02-06 PROCEDURE — 87591 CHLAMYDIA/GONORRHOEAE(GC), PCR: ICD-10-PCS | Mod: ,,, | Performed by: CLINICAL MEDICAL LABORATORY

## 2023-02-06 PROCEDURE — 3008F PR BODY MASS INDEX (BMI) DOCUMENTED: ICD-10-PCS | Mod: ,,, | Performed by: NURSE PRACTITIONER

## 2023-02-06 PROCEDURE — 3078F DIAST BP <80 MM HG: CPT | Mod: ,,, | Performed by: NURSE PRACTITIONER

## 2023-02-06 PROCEDURE — 80061 LIPID PANEL: CPT | Mod: ,,, | Performed by: CLINICAL MEDICAL LABORATORY

## 2023-02-06 PROCEDURE — 87210 WET PREP, GENITAL: ICD-10-PCS | Mod: XU,,, | Performed by: CLINICAL MEDICAL LABORATORY

## 2023-02-06 PROCEDURE — 85025 COMPLETE CBC W/AUTO DIFF WBC: CPT | Mod: ,,, | Performed by: CLINICAL MEDICAL LABORATORY

## 2023-02-06 RX ORDER — OMEPRAZOLE 40 MG/1
40 CAPSULE, DELAYED RELEASE ORAL NIGHTLY
Qty: 30 CAPSULE | Refills: 1 | Status: ON HOLD | OUTPATIENT
Start: 2023-02-06 | End: 2023-04-12 | Stop reason: ALTCHOICE

## 2023-02-06 NOTE — PROGRESS NOTES
"   Johanny Freeman DNP   [address] [place] [id number]     PATIENT NAME: Jing Hanson  : 1995  DATE: 23  MRN: [id number]      Billing Provider: Johanny Freeman DNP  Level of Service:   Patient PCP Information       Provider PCP Type    Johanny Freeman DNP General            Reason for Visit / Chief Complaint: Abdominal Pain       Update PCP  Update Chief Complaint         History of Present Illness / Problem Focused Workflow     Jing Hanson presents to the clinic with Abdominal Pain     Abdominal Pain    Review of Systems     Review of Systems   Gastrointestinal:  Positive for abdominal pain.      Medical / Social / Family History     Past Medical History:   Diagnosis Date    Abnormal Pap smear of cervix        Past Surgical History:   Procedure Laterality Date    APPENDECTOMY      TUBAL LIGATION         Social History  Ms.  reports that she has never smoked. She has never used smokeless tobacco. She reports current alcohol use. She reports that she does not use drugs.    Family History  Ms.'s family history includes Diabetes in her father and mother; Epilepsy in her mother; Hypertension in her father and mother.    Medications and Allergies     Medications  Outpatient Medications Marked as Taking for the 23 encounter (Office Visit) with Johanny Freeman DNP   Medication Sig Dispense Refill    ondansetron (ZOFRAN-ODT) 4 MG TbDL Take 1 tablet (4 mg total) by mouth 2 (two) times daily. 60 tablet 0       Allergies  Review of patient's allergies indicates:  No Known Allergies    Physical Examination   /76 (BP Location: Left arm, Patient Position: Sitting, BP Method: X-Large (Automatic))   Pulse 93   Temp 96.9 °F (36.1 °C) (Oral)   Resp 18   Ht 5' 8" (1.727 m)   Wt 122.6 kg (270 lb 3.2 oz)   LMP 2023   SpO2 99%   BMI 41.08 kg/m²    Physical Exam  Vitals and nursing note reviewed.   Constitutional:       Appearance: Normal appearance. She is obese. She is " ill-appearing.   HENT:      Head: Normocephalic.      Nose: Nose normal.      Mouth/Throat:      Mouth: Mucous membranes are moist.   Eyes:      Pupils: Pupils are equal, round, and reactive to light.   Cardiovascular:      Rate and Rhythm: Normal rate and regular rhythm.      Pulses: Normal pulses.      Heart sounds: Normal heart sounds.   Pulmonary:      Effort: Pulmonary effort is normal.      Breath sounds: Normal breath sounds.   Abdominal:      General: Abdomen is flat. Bowel sounds are normal.      Palpations: Abdomen is soft.      Tenderness: There is abdominal tenderness. There is right CVA tenderness.   Musculoskeletal:         General: Normal range of motion.      Cervical back: Normal range of motion and neck supple.   Skin:     General: Skin is warm and dry.      Capillary Refill: Capillary refill takes less than 2 seconds.   Neurological:      General: No focal deficit present.      Mental Status: She is alert and oriented to person, place, and time. Mental status is at baseline.   Psychiatric:         Mood and Affect: Mood normal.         Behavior: Behavior normal.         Thought Content: Thought content normal.         Judgment: Judgment normal.        Assessment and Plan (including Health Maintenance)      Problem List  Smart [employer]  Document Outside HM   :    Plan:         There are no preventive care reminders to display for this patient.    Problem List Items Addressed This Visit          Cardiac/Vascular    Encounter for screening for cardiovascular disorders    Relevant Orders    Lipid Panel       Endocrine    BMI 35.0-35.9,adult       GI    Abdominal pain - Primary    Relevant Orders    Comprehensive Metabolic Panel    CBC Auto Differential    X-Ray KUB (Completed)    H. pylori Breath Test    US Abdomen Limited    NM Hepatobiliary Scan (HIDA)    Nausea       Health Maintenance Topics with due status: Not Due       Topic Last Completion Date    Pap Smear 10/11/2022       No future appointments.          Signature:  Johanny Freeman, DNP      [address] [place] [address]    Date of encounter: 2/6/23

## 2023-02-07 LAB
CHLAMYDIA BY PCR: NEGATIVE
N. GONORRHOEAE (GC) BY PCR: NEGATIVE
TRICHOMONAS NAT: NEGATIVE

## 2023-02-16 ENCOUNTER — HOSPITAL ENCOUNTER (OUTPATIENT)
Dept: RADIOLOGY | Facility: HOSPITAL | Age: 28
Discharge: HOME | End: 2023-02-16
Attending: NURSE PRACTITIONER
Payer: MEDICAID

## 2023-02-16 DIAGNOSIS — R10.11 RIGHT UPPER QUADRANT ABDOMINAL PAIN: ICD-10-CM

## 2023-02-16 PROCEDURE — 78226 HEPATOBILIARY SYSTEM IMAGING: CPT | Mod: 26,,, | Performed by: RADIOLOGY

## 2023-02-16 PROCEDURE — 78226 HEPATOBILIARY SYSTEM IMAGING: CPT | Mod: TC

## 2023-02-16 PROCEDURE — 76705 ECHO EXAM OF ABDOMEN: CPT | Mod: TC

## 2023-02-16 PROCEDURE — 76705 ECHO EXAM OF ABDOMEN: CPT | Mod: 26,,, | Performed by: RADIOLOGY

## 2023-02-16 PROCEDURE — 76705 US ABDOMEN LIMITED: ICD-10-PCS | Mod: 26,,, | Performed by: RADIOLOGY

## 2023-02-16 PROCEDURE — A9537 TC99M MEBROFENIN: HCPCS

## 2023-02-16 PROCEDURE — 78226 NM HEPATOBILIARY IMAGING INC GB (HIDA): ICD-10-PCS | Mod: 26,,, | Performed by: RADIOLOGY

## 2023-02-21 ENCOUNTER — PATIENT MESSAGE (OUTPATIENT)
Dept: FAMILY MEDICINE | Facility: CLINIC | Age: 28
End: 2023-02-21
Payer: MEDICAID

## 2023-02-21 ENCOUNTER — TELEPHONE (OUTPATIENT)
Dept: FAMILY MEDICINE | Facility: CLINIC | Age: 28
End: 2023-02-21
Payer: MEDICAID

## 2023-02-21 DIAGNOSIS — K82.8 DYSFUNCTIONAL GALLBLADDER: Primary | ICD-10-CM

## 2023-02-21 NOTE — TELEPHONE ENCOUNTER
----- Message from Johanny Freeman DNP sent at 2/16/2023  1:08 PM CST -----  Let her know gallbladder test was abnormal and I think its whats causing all her issues over the last year   Order surgery referral for decrease gallbladder function thanks

## 2023-03-21 ENCOUNTER — OFFICE VISIT (OUTPATIENT)
Dept: SURGERY | Facility: CLINIC | Age: 28
End: 2023-03-21
Attending: SURGERY
Payer: MEDICAID

## 2023-03-21 VITALS
HEIGHT: 68 IN | OXYGEN SATURATION: 97 % | SYSTOLIC BLOOD PRESSURE: 118 MMHG | WEIGHT: 264 LBS | DIASTOLIC BLOOD PRESSURE: 60 MMHG | TEMPERATURE: 98 F | HEART RATE: 68 BPM | RESPIRATION RATE: 18 BRPM | BODY MASS INDEX: 40.01 KG/M2

## 2023-03-21 DIAGNOSIS — K82.8 DYSFUNCTIONAL GALLBLADDER: Primary | ICD-10-CM

## 2023-03-21 PROCEDURE — 3074F SYST BP LT 130 MM HG: CPT | Mod: ,,, | Performed by: SURGERY

## 2023-03-21 PROCEDURE — 3008F BODY MASS INDEX DOCD: CPT | Mod: ,,, | Performed by: SURGERY

## 2023-03-21 PROCEDURE — 3078F PR MOST RECENT DIASTOLIC BLOOD PRESSURE < 80 MM HG: ICD-10-PCS | Mod: ,,, | Performed by: SURGERY

## 2023-03-21 PROCEDURE — 3008F PR BODY MASS INDEX (BMI) DOCUMENTED: ICD-10-PCS | Mod: ,,, | Performed by: SURGERY

## 2023-03-21 PROCEDURE — 3074F PR MOST RECENT SYSTOLIC BLOOD PRESSURE < 130 MM HG: ICD-10-PCS | Mod: ,,, | Performed by: SURGERY

## 2023-03-21 PROCEDURE — 99205 PR OFFICE/OUTPT VISIT, NEW, LEVL V, 60-74 MIN: ICD-10-PCS | Mod: S$PBB,,, | Performed by: SURGERY

## 2023-03-21 PROCEDURE — 99205 OFFICE O/P NEW HI 60 MIN: CPT | Mod: S$PBB,,, | Performed by: SURGERY

## 2023-03-21 PROCEDURE — 99215 OFFICE O/P EST HI 40 MIN: CPT | Mod: PBBFAC | Performed by: SURGERY

## 2023-03-21 PROCEDURE — 3078F DIAST BP <80 MM HG: CPT | Mod: ,,, | Performed by: SURGERY

## 2023-03-21 NOTE — PROGRESS NOTES
Subjective:       Patient ID: Jing Hanson is a 27 y.o. female.    Chief Complaint: Abdominal Pain (Pt is new and says she is having abdominal pain in the lower abdomen, all the way across abdomen--nothing particular brings the pain on--does not like to take pain meds for it--)    This 27-year-old female patient presents with a history of abdominal pain for a few years.  She states she had a laparoscopy and appendectomy a couple of years ago per Dr. Henderson of Gynecology.  She reports that she has continued to have pain.  She relates it is mostly in the upper abdomen, but is not very specific.  She has occasional nausea without emesis.    A recent HIDA scan was performed revealing ejection fraction of 28%.  She is referred for potential cholecystectomy.    Abdominal Pain  Pertinent negatives include no fever.     family history includes Diabetes in her father and mother; Epilepsy in her mother; Hypertension in her father and mother.  Past Medical History:   Diagnosis Date    Abnormal Pap smear of cervix       Past Surgical History:   Procedure Laterality Date    APPENDECTOMY      TUBAL LIGATION         reports that she has never smoked. She has never used smokeless tobacco. She reports current alcohol use. She reports that she does not use drugs.     Review of Systems   Constitutional:  Negative for appetite change and fever.   HENT:  Negative for sore throat. Hearing loss: cough.   Eyes:  Negative for visual disturbance.   Respiratory:  Negative for cough and shortness of breath.    Cardiovascular:  Negative for chest pain.   Gastrointestinal:  Positive for abdominal pain.   Endocrine: Negative.    Genitourinary: Negative.    Musculoskeletal: Negative.    Skin:  Negative for rash and wound.   Neurological:  Negative for numbness.   Hematological: Negative.    Psychiatric/Behavioral: Negative.          Objective:      /60 (BP Location: Right arm, Patient Position: Sitting)   Pulse 68   Temp 97.8 °F (36.6 °C)  "(Oral)   Resp 18   Ht 5' 8" (1.727 m)   Wt 119.7 kg (264 lb)   LMP 02/28/2023 (Exact Date) Comment: went off march 6th  SpO2 97%   BMI 40.14 kg/m²    Physical Exam  Cardiovascular:      Rate and Rhythm: Normal rate.      Pulses: Normal pulses.      Heart sounds: No murmur heard.  Pulmonary:      Effort: Pulmonary effort is normal. No respiratory distress.   Abdominal:      Palpations: Abdomen is soft.   Skin:     Coloration: Skin is not jaundiced.   Neurological:      General: No focal deficit present.      Mental Status: She is alert.   Psychiatric:         Mood and Affect: Mood normal.       Assessment/Plan:         Dysfunctional gallbladder  -     Ambulatory referral/consult to General Surgery  -     CBC Auto Differential; Future; Expected date: 03/21/2023  -     Cancel: Ambulatory Referral to External Surgery  -     Comprehensive Metabolic Panel; Future; Expected date: 03/21/2023  -     Ambulatory Referral to External Surgery         Problem List Items Addressed This Visit          GI    Dysfunctional gallbladder - Primary    Current Assessment & Plan     Risks and benefits of surgery discussed to include infection, bleeding, hernia, possible drain, duct injury, open conversion, and unforeseen.  Patient expresses understanding and wishes to proceed.         Relevant Orders    CBC Auto Differential    Comprehensive Metabolic Panel    Ambulatory Referral to External Surgery        "

## 2023-03-21 NOTE — ASSESSMENT & PLAN NOTE
Risks and benefits of surgery discussed to include infection, bleeding, hernia, possible drain, duct injury, open conversion, and unforeseen.  Patient expresses understanding and wishes to proceed.

## 2023-03-21 NOTE — PATIENT INSTRUCTIONS
[facility]  [address] [facility] [place] [address]      YOUR SURGERY DATE IS 4/10/23    LABWORK AND EKG IS SCHEDULED FOR today. PLEASE SIGN IN ON 1ST FLOOR OF THE  [facility] FOR LAB.  EKG IS LOCATED ON 2ND FLOOR.      DO NOT EAT OR DRINK ANYTHING AFTER MIDNIGHT BEFORE YOU SURGERY    BRING ALL MEDICATIONS YOU ARE CURRENTLY TAKING WITH YOU.  IF YOU ARE TAKING  A BLOOD PRESSURE MEDICATION, TAKE YOUR BLOOD PRESSURE MEDICATION WITH A   SIP OF WATER WHEN YOU GET UP THE MORNING OF SURGERY.     YOU MUST PRE-ADMIT AT THE FOLLOWING WEBSITE:  WEBSITE: [email or web address]  PASSWORD: [email or web address]    A STAFF MEMBER FROM THE [facility] WILL CALL YOU THE DAY BEFORE  SURGERY TO LET YOU KNOW WHAT TIME TO ARRIVE THE MORNING OF SURGERY.  IF YOU HAVE NOT HEARD FROM THEM BY 4 P.M. THE DAY BEFORE YOUR SURGERY,   PLEASE CALL THEM [phone].      IF YOU HAVE ANY QUESTIONS YOU MAY CONTACT OUR OFFICE [phone].

## 2023-04-12 ENCOUNTER — ANESTHESIA (OUTPATIENT)
Dept: SURGERY | Facility: HOSPITAL | Age: 28
End: 2023-04-12
Payer: MEDICAID

## 2023-04-12 ENCOUNTER — ANESTHESIA EVENT (OUTPATIENT)
Dept: SURGERY | Facility: HOSPITAL | Age: 28
End: 2023-04-12
Payer: MEDICAID

## 2023-04-12 ENCOUNTER — HOSPITAL ENCOUNTER (OUTPATIENT)
Facility: HOSPITAL | Age: 28
Discharge: HOME | End: 2023-04-12
Attending: SURGERY | Admitting: SURGERY
Payer: MEDICAID

## 2023-04-12 VITALS
BODY MASS INDEX: 40.01 KG/M2 | OXYGEN SATURATION: 94 % | HEIGHT: 68 IN | WEIGHT: 264 LBS | SYSTOLIC BLOOD PRESSURE: 132 MMHG | HEART RATE: 73 BPM | DIASTOLIC BLOOD PRESSURE: 81 MMHG | RESPIRATION RATE: 16 BRPM | TEMPERATURE: 98 F

## 2023-04-12 DIAGNOSIS — K82.8 DYSFUNCTIONAL GALLBLADDER: Primary | ICD-10-CM

## 2023-04-12 DIAGNOSIS — K42.9 UMBILICAL HERNIA WITHOUT OBSTRUCTION AND WITHOUT GANGRENE: ICD-10-CM

## 2023-04-12 PROCEDURE — D9220A PRA ANESTHESIA: Mod: ANES,,, | Performed by: ANESTHESIOLOGY

## 2023-04-12 PROCEDURE — 27000716 HC OXISENSOR PROBE, ANY SIZE: Performed by: ANESTHESIOLOGY

## 2023-04-12 PROCEDURE — 36000709 HC OR TIME LEV III EA ADD 15 MIN: Performed by: SURGERY

## 2023-04-12 PROCEDURE — 25000003 PHARM REV CODE 250: Performed by: ANESTHESIOLOGY

## 2023-04-12 PROCEDURE — 27000165 HC TUBE, ETT CUFFED: Performed by: ANESTHESIOLOGY

## 2023-04-12 PROCEDURE — 25000003 PHARM REV CODE 250: Performed by: SURGERY

## 2023-04-12 PROCEDURE — 25000003 PHARM REV CODE 250: Performed by: NURSE ANESTHETIST, CERTIFIED REGISTERED

## 2023-04-12 PROCEDURE — 37000009 HC ANESTHESIA EA ADD 15 MINS: Performed by: SURGERY

## 2023-04-12 PROCEDURE — 71000016 HC POSTOP RECOV ADDL HR: Performed by: SURGERY

## 2023-04-12 PROCEDURE — 63600175 PHARM REV CODE 636 W HCPCS: Performed by: NURSE ANESTHETIST, CERTIFIED REGISTERED

## 2023-04-12 PROCEDURE — 88304 TISSUE EXAM BY PATHOLOGIST: CPT | Mod: TC,SUR | Performed by: SURGERY

## 2023-04-12 PROCEDURE — D9220A PRA ANESTHESIA: ICD-10-PCS | Mod: CRNA,,, | Performed by: NURSE ANESTHETIST, CERTIFIED REGISTERED

## 2023-04-12 PROCEDURE — 71000033 HC RECOVERY, INTIAL HOUR: Performed by: SURGERY

## 2023-04-12 PROCEDURE — 27201423 OPTIME MED/SURG SUP & DEVICES STERILE SUPPLY: Performed by: SURGERY

## 2023-04-12 PROCEDURE — 88304 SURGICAL PATHOLOGY: ICD-10-PCS | Mod: 26,,, | Performed by: PATHOLOGY

## 2023-04-12 PROCEDURE — 88304 TISSUE EXAM BY PATHOLOGIST: CPT | Mod: 26,,, | Performed by: PATHOLOGY

## 2023-04-12 PROCEDURE — 37000008 HC ANESTHESIA 1ST 15 MINUTES: Performed by: SURGERY

## 2023-04-12 PROCEDURE — 27000655: Performed by: ANESTHESIOLOGY

## 2023-04-12 PROCEDURE — 27000284 HC CANNULA NASAL: Performed by: ANESTHESIOLOGY

## 2023-04-12 PROCEDURE — 27000510 HC BLANKET BAIR HUGGER ANY SIZE: Performed by: ANESTHESIOLOGY

## 2023-04-12 PROCEDURE — 71000015 HC POSTOP RECOV 1ST HR: Performed by: SURGERY

## 2023-04-12 PROCEDURE — 27000689 HC BLADE LARYNGOSCOPE ANY SIZE: Performed by: ANESTHESIOLOGY

## 2023-04-12 PROCEDURE — 47562 LAPAROSCOPIC CHOLECYSTECTOMY: CPT | Mod: ,,, | Performed by: SURGERY

## 2023-04-12 PROCEDURE — D9220A PRA ANESTHESIA: ICD-10-PCS | Mod: ANES,,, | Performed by: ANESTHESIOLOGY

## 2023-04-12 PROCEDURE — 47562 PR LAP,CHOLECYSTECTOMY: ICD-10-PCS | Mod: ,,, | Performed by: SURGERY

## 2023-04-12 PROCEDURE — 36000708 HC OR TIME LEV III 1ST 15 MIN: Performed by: SURGERY

## 2023-04-12 PROCEDURE — D9220A PRA ANESTHESIA: Mod: CRNA,,, | Performed by: NURSE ANESTHETIST, CERTIFIED REGISTERED

## 2023-04-12 RX ORDER — ROCURONIUM BROMIDE 10 MG/ML
INJECTION, SOLUTION INTRAVENOUS
Status: DISCONTINUED | OUTPATIENT
Start: 2023-04-12 | End: 2023-04-12

## 2023-04-12 RX ORDER — MEPERIDINE HYDROCHLORIDE 25 MG/ML
25 INJECTION INTRAMUSCULAR; INTRAVENOUS; SUBCUTANEOUS EVERY 10 MIN PRN
Status: DISCONTINUED | OUTPATIENT
Start: 2023-04-12 | End: 2023-04-12 | Stop reason: HOSPADM

## 2023-04-12 RX ORDER — HYDROMORPHONE HYDROCHLORIDE 2 MG/ML
INJECTION, SOLUTION INTRAMUSCULAR; INTRAVENOUS; SUBCUTANEOUS
Status: DISCONTINUED | OUTPATIENT
Start: 2023-04-12 | End: 2023-04-12

## 2023-04-12 RX ORDER — CEFAZOLIN SODIUM 1 G/3ML
INJECTION, POWDER, FOR SOLUTION INTRAMUSCULAR; INTRAVENOUS
Status: DISCONTINUED | OUTPATIENT
Start: 2023-04-12 | End: 2023-04-12

## 2023-04-12 RX ORDER — HYDROMORPHONE HYDROCHLORIDE 2 MG/ML
0.5 INJECTION, SOLUTION INTRAMUSCULAR; INTRAVENOUS; SUBCUTANEOUS EVERY 5 MIN PRN
Status: DISCONTINUED | OUTPATIENT
Start: 2023-04-12 | End: 2023-04-12 | Stop reason: HOSPADM

## 2023-04-12 RX ORDER — OXYCODONE HYDROCHLORIDE 5 MG/1
5 TABLET ORAL ONCE
Status: COMPLETED | OUTPATIENT
Start: 2023-04-12 | End: 2023-04-12

## 2023-04-12 RX ORDER — ONDANSETRON 4 MG/1
4 TABLET, FILM COATED ORAL ONCE
Status: COMPLETED | OUTPATIENT
Start: 2023-04-12 | End: 2023-04-12

## 2023-04-12 RX ORDER — MIDAZOLAM HYDROCHLORIDE 1 MG/ML
INJECTION INTRAMUSCULAR; INTRAVENOUS
Status: DISCONTINUED | OUTPATIENT
Start: 2023-04-12 | End: 2023-04-12

## 2023-04-12 RX ORDER — PROPOFOL 10 MG/ML
VIAL (ML) INTRAVENOUS
Status: DISCONTINUED | OUTPATIENT
Start: 2023-04-12 | End: 2023-04-12

## 2023-04-12 RX ORDER — ONDANSETRON 2 MG/ML
4 INJECTION INTRAMUSCULAR; INTRAVENOUS DAILY PRN
Status: DISCONTINUED | OUTPATIENT
Start: 2023-04-12 | End: 2023-04-12 | Stop reason: HOSPADM

## 2023-04-12 RX ORDER — FENTANYL CITRATE 50 UG/ML
INJECTION, SOLUTION INTRAMUSCULAR; INTRAVENOUS
Status: DISCONTINUED | OUTPATIENT
Start: 2023-04-12 | End: 2023-04-12

## 2023-04-12 RX ORDER — BUPIVACAINE HYDROCHLORIDE 2.5 MG/ML
INJECTION, SOLUTION EPIDURAL; INFILTRATION; INTRACAUDAL
Status: DISCONTINUED | OUTPATIENT
Start: 2023-04-12 | End: 2023-04-12 | Stop reason: HOSPADM

## 2023-04-12 RX ORDER — LIDOCAINE HYDROCHLORIDE 20 MG/ML
INJECTION, SOLUTION EPIDURAL; INFILTRATION; INTRACAUDAL; PERINEURAL
Status: DISCONTINUED | OUTPATIENT
Start: 2023-04-12 | End: 2023-04-12

## 2023-04-12 RX ORDER — SODIUM CHLORIDE, SODIUM LACTATE, POTASSIUM CHLORIDE, CALCIUM CHLORIDE 600; 310; 30; 20 MG/100ML; MG/100ML; MG/100ML; MG/100ML
INJECTION, SOLUTION INTRAVENOUS
Status: COMPLETED
Start: 2023-04-12 | End: 2023-04-12

## 2023-04-12 RX ORDER — KETOROLAC TROMETHAMINE 30 MG/ML
INJECTION, SOLUTION INTRAMUSCULAR; INTRAVENOUS
Status: DISCONTINUED | OUTPATIENT
Start: 2023-04-12 | End: 2023-04-12

## 2023-04-12 RX ORDER — DIMENHYDRINATE 50 MG
50 TABLET ORAL ONCE
Status: DISCONTINUED | OUTPATIENT
Start: 2023-04-12 | End: 2023-04-12 | Stop reason: HOSPADM

## 2023-04-12 RX ORDER — DIPHENHYDRAMINE HYDROCHLORIDE 50 MG/ML
25 INJECTION INTRAMUSCULAR; INTRAVENOUS EVERY 6 HOURS PRN
Status: DISCONTINUED | OUTPATIENT
Start: 2023-04-12 | End: 2023-04-12 | Stop reason: HOSPADM

## 2023-04-12 RX ORDER — DEXAMETHASONE SODIUM PHOSPHATE 4 MG/ML
INJECTION, SOLUTION INTRA-ARTICULAR; INTRALESIONAL; INTRAMUSCULAR; INTRAVENOUS; SOFT TISSUE
Status: DISCONTINUED | OUTPATIENT
Start: 2023-04-12 | End: 2023-04-12

## 2023-04-12 RX ORDER — MORPHINE SULFATE 10 MG/ML
4 INJECTION INTRAMUSCULAR; INTRAVENOUS; SUBCUTANEOUS EVERY 5 MIN PRN
Status: DISCONTINUED | OUTPATIENT
Start: 2023-04-12 | End: 2023-04-12 | Stop reason: HOSPADM

## 2023-04-12 RX ORDER — ONDANSETRON 2 MG/ML
INJECTION INTRAMUSCULAR; INTRAVENOUS
Status: DISCONTINUED | OUTPATIENT
Start: 2023-04-12 | End: 2023-04-12

## 2023-04-12 RX ORDER — SODIUM CHLORIDE, SODIUM LACTATE, POTASSIUM CHLORIDE, CALCIUM CHLORIDE 600; 310; 30; 20 MG/100ML; MG/100ML; MG/100ML; MG/100ML
INJECTION, SOLUTION INTRAVENOUS CONTINUOUS PRN
Status: DISCONTINUED | OUTPATIENT
Start: 2023-04-12 | End: 2023-04-12

## 2023-04-12 RX ADMIN — ROCURONIUM BROMIDE 20 MG: 10 INJECTION, SOLUTION INTRAVENOUS at 10:04

## 2023-04-12 RX ADMIN — HYDROMORPHONE HYDROCHLORIDE 0.5 MG: 2 INJECTION, SOLUTION INTRAMUSCULAR; INTRAVENOUS; SUBCUTANEOUS at 10:04

## 2023-04-12 RX ADMIN — OXYCODONE HYDROCHLORIDE 5 MG: 5 TABLET ORAL at 01:04

## 2023-04-12 RX ADMIN — ONDANSETRON 4 MG: 2 INJECTION INTRAMUSCULAR; INTRAVENOUS at 09:04

## 2023-04-12 RX ADMIN — MIDAZOLAM HYDROCHLORIDE 2 MG: 1 INJECTION, SOLUTION INTRAMUSCULAR; INTRAVENOUS at 09:04

## 2023-04-12 RX ADMIN — SODIUM CHLORIDE, POTASSIUM CHLORIDE, SODIUM LACTATE AND CALCIUM CHLORIDE: 600; 310; 30; 20 INJECTION, SOLUTION INTRAVENOUS at 09:04

## 2023-04-12 RX ADMIN — FENTANYL CITRATE 50 MCG: 50 INJECTION INTRAMUSCULAR; INTRAVENOUS at 10:04

## 2023-04-12 RX ADMIN — ROCURONIUM BROMIDE 40 MG: 10 INJECTION, SOLUTION INTRAVENOUS at 09:04

## 2023-04-12 RX ADMIN — PROPOFOL 20 MG: 10 INJECTION, EMULSION INTRAVENOUS at 09:04

## 2023-04-12 RX ADMIN — FENTANYL CITRATE 50 MCG: 50 INJECTION INTRAMUSCULAR; INTRAVENOUS at 09:04

## 2023-04-12 RX ADMIN — CEFAZOLIN 2 G: 1 INJECTION, POWDER, FOR SOLUTION INTRAMUSCULAR; INTRAVENOUS; PARENTERAL at 10:04

## 2023-04-12 RX ADMIN — KETOROLAC TROMETHAMINE 30 MG: 30 INJECTION, SOLUTION INTRAMUSCULAR at 10:04

## 2023-04-12 RX ADMIN — SUGAMMADEX 200 MG: 100 INJECTION, SOLUTION INTRAVENOUS at 11:04

## 2023-04-12 RX ADMIN — ONDANSETRON HYDROCHLORIDE 4 MG: 4 TABLET, FILM COATED ORAL at 09:04

## 2023-04-12 RX ADMIN — LIDOCAINE HYDROCHLORIDE 80 MG: 20 INJECTION, SOLUTION INTRAVENOUS at 09:04

## 2023-04-12 RX ADMIN — DEXAMETHASONE SODIUM PHOSPHATE 8 MG: 4 INJECTION, SOLUTION INTRA-ARTICULAR; INTRALESIONAL; INTRAMUSCULAR; INTRAVENOUS; SOFT TISSUE at 09:04

## 2023-04-12 NOTE — OP NOTE
[facility] - Periop Services     Operative Note    SUMMARY     Date of Procedure: 4/12/2023     Procedure: Procedure(s) (LRB):  CHOLECYSTECTOMY, LAPAROSCOPIC (N/A)  REPAIR, HERNIA, UMBILICAL (N/A)     Surgeon(s) and Role:     * Nikolay Johnson MD - Primary    Assisting Surgeon: None    Pre-Operative Diagnosis: Dysfunctional gallbladder [K82.8]    Post-Operative Diagnosis: Post-Op Diagnosis Codes:     * Dysfunctional gallbladder [K82.8]    Anesthesia: General    Technical Procedures Used: The patient was brought to the operating room and placed in supine position. General anesthesia was administered. The abdomen was prepped and draped in standard fashion. An incision was performed lateral to the umbilicus dissection was carried down through subcutaneous tissue bluntly, to the herniated omentum.  The omentum would not reduce, and the fascial defect had to be enlarged.  Subsequent to this, the omentum reduced and a Willi cannula was placed.  CO2 pneumoperitoneum was established after inserting Willi cannula. 5 mm trochars were then placed in the epigastrium, midepigastrium, and right upper quadrant. The fundus was retracted upward and over the liver, and the infundibulum was retracted laterally. The cystic duct and cystic artery were dissected out, clipped twice proximally, once distally and transected. Cautery was then used to dissect the gallbladder free from the surface of the liver.  We assured hemostasis, using cautery in the liver bed where necessary.  Port sites were then infiltrated with local at the level of the fascia and peritoneum. The gallbladder was removed using an Endo-Catch.  Ports were then removed and CO2 pneumoperitoneum was allowed to escape. The umbilical column was then dissected away from the edges of the fascial defect.  The fascial defect was then closed using interrupted 0 Ethibond and continuous running PDS. All port sites closed skin level with subcuticular Vicryl. Patient was  awakened from anesthesia in stable condition.   .    Description of the Findings of the Procedure:  The patient had an umbilical hernia of roughly 1 cm in size, which had to be enlarged slightly to allow for reading duction of omentum back through the defect.  Hernia repair was performed at the conclusion of the procedure.  Regarding the gallbladder findings, the duct and artery were well seen.  Operation was completed laparoscopically.    Assistant(s): JENNIFFER Coyle III     Complications: No    Estimated Blood Loss (EBL): 10 cc    Implants: * No implants in log *    Specimens:   Gallbladder to pathology    Specimen (24h ago, onward)       Start     Ordered    04/12/23 1029  Surgical Pathology  RELEASE UPON ORDERING         04/12/23 1029                     Condition: Good      Complications:  None

## 2023-04-12 NOTE — ANESTHESIA POSTPROCEDURE EVALUATION
Anesthesia Post Evaluation    Patient: Jing Hanson    Procedure(s) Performed: Procedure(s) (LRB):  CHOLECYSTECTOMY, LAPAROSCOPIC (N/A)  REPAIR, HERNIA, UMBILICAL (N/A)    Final Anesthesia Type: general      Patient location during evaluation: PACU  Patient participation: Yes- Able to Participate  Level of consciousness: awake and sedated  Post-procedure vital signs: reviewed and stable  Pain management: adequate  Airway patency: patent    PONV status at discharge: No PONV  Anesthetic complications: no      Cardiovascular status: blood pressure returned to baseline  Respiratory status: unassisted  Hydration status: euvolemic  Follow-up not needed.          Vitals Value Taken Time   /81 04/12/23 1306   Temp 36.6 °C (97.9 °F) 04/12/23 1135   Pulse 77 04/12/23 1248   Resp 16 04/12/23 1315   SpO2 96 % 04/12/23 1248   Vitals shown include unvalidated device data.      Event Time   Out of Recovery 12:06:43         Pain/Darrius Score: Pain Rating Prior to Med Admin: 4 (4/12/2023  1:15 PM)  Darrius Score: 10 (4/12/2023  1:06 PM)  Modified Darrius Score: 19 (4/12/2023  1:06 PM)

## 2023-04-12 NOTE — TRANSFER OF CARE
"Anesthesia Transfer of Care Note    Patient: Jing Hanson    Procedure(s) Performed: Procedure(s) (LRB):  CHOLECYSTECTOMY, LAPAROSCOPIC (N/A)  REPAIR, HERNIA, UMBILICAL (N/A)    Patient location: PACU    Anesthesia Type: general    Transport from OR: Transported from OR on 2-3 L/min O2 by NC with adequate spontaneous ventilation    Post pain: adequate analgesia    Post assessment: no apparent anesthetic complications and tolerated procedure well    Post vital signs: stable    Level of consciousness: alert, responds to stimulation and awake    Nausea/Vomiting: no nausea/vomiting    Complications: none    Transfer of care protocol was followed      Last vitals:   Visit Vitals  /77   Pulse 90   Temp 36.6 °C (97.9 °F)   Resp 10   Ht 5' 8" (1.727 m)   Wt 119.7 kg (264 lb)   LMP 03/06/2023   SpO2 99%   Breastfeeding No   BMI 40.14 kg/m²     "

## 2023-04-12 NOTE — ANESTHESIA PREPROCEDURE EVALUATION
04/12/2023  Jing Hanson is a 27 y.o., female.      Pre-op Assessment    I have reviewed the Patient Summary Reports.    I have reviewed the NPO Status.   I have reviewed the Medications.     Review of Systems  Social:  Non-Smoker, No Alcohol Use    Hematology/Oncology:  Hematology Normal   Oncology Normal     EENT/Dental:EENT/Dental Normal   Cardiovascular:  Cardiovascular Normal     Pulmonary:  Pulmonary Normal    Renal/:  Renal/ Normal     Hepatic/GI:  Hepatic/GI Normal    Musculoskeletal:  Musculoskeletal Normal    Neurological:  Neurology Normal    Endocrine:  Endocrine Normal  Morbid Obesity / BMI > 40  Dermatological:  Skin Normal    Psych:  Psychiatric Normal           Physical Exam  General: Well nourished, Cooperative, Alert and Oriented    Airway:  Mallampati: II / II  Mouth Opening: Normal  TM Distance: Normal  Neck ROM: Normal ROM    Dental:  Intact    Chest/Lungs:  Clear to auscultation    Heart:  Rate: Normal  Rhythm: Regular Rhythm  Sounds: Normal        Chemistry        Component Value Date/Time     04/10/2023 0938    K 4.0 04/10/2023 0938     04/10/2023 0938    CO2 28 04/10/2023 0938    BUN 15 04/10/2023 0938    CREATININE 0.60 04/10/2023 0938     04/10/2023 0938        Component Value Date/Time    CALCIUM 9.2 04/10/2023 0938    ALKPHOS 88 04/10/2023 0938    AST 21 04/10/2023 0938    ALT 33 04/10/2023 0938    BILITOT 0.2 04/10/2023 0938    EGFRNONAA 154 06/27/2022 1201        Lab Results   Component Value Date    WBC 9.34 04/10/2023    HGB 11.4 (L) 04/10/2023    HCT 36.9 (L) 04/10/2023     04/10/2023     No results found for this or any previous visit.      Anesthesia Plan  Type of Anesthesia, risks & benefits discussed:    Anesthesia Type: Gen ETT  Intra-op Monitoring Plan: Standard ASA Monitors  Post Op Pain Control Plan: multimodal analgesia  Induction:   IV  Airway Plan: Direct  Informed Consent: Informed consent signed with the Patient and all parties understand the risks and agree with anesthesia plan.  All questions answered.   ASA Score: 2  Day of Surgery Review of History & Physical: H&P Update referred to the surgeon/provider.I have interviewed and examined the patient. I have reviewed the patient's H&P dated:     Ready For Surgery From Anesthesia Perspective.     .

## 2023-04-12 NOTE — DISCHARGE SUMMARY
[facility] - [facility]iop [facility]  Discharge Note  Short Stay    Procedure(s) (LRB):  CHOLECYSTECTOMY, LAPAROSCOPIC (N/A)  REPAIR, HERNIA, UMBILICAL (N/A)      OUTCOME: Patient tolerated treatment/procedure well without complication and is now ready for discharge.    DISPOSITION: Home or Self Care    FINAL DIAGNOSIS:  Dysfunctional gallbladder    FOLLOWUP: In clinic    DISCHARGE INSTRUCTIONS:    Discharge Procedure Orders   Diet Adult Regular     Lifting restrictions     Remove dressing in 48 hours   Order Comments: Leave Steri-Strips on after removing Band-Aids     Shower on day dressing removed (No bath)        TIME SPENT ON DISCHARGE: 10 minutes

## 2023-04-12 NOTE — ANESTHESIA PROCEDURE NOTES
Intubation    Date/Time: 4/12/2023 9:57 AM  Performed by: Ugo Norman Jr., CRNA  Authorized by: Zheng Amor MD     Intubation:     Induction:  Intravenous    Intubated:  Postinduction    Mask Ventilation:  Easy with oral airway    Attempts:  1    Attempted By:  CRNA    Method of Intubation:  Direct    Blade:  Griselda 3    Laryngeal View Grade: Grade I - full view of cords      Difficult Airway Encountered?: No      Complications:  None    Airway Device:  Oral endotracheal tube    Airway Device Size:  7.5    Style/Cuff Inflation:  Cuffed (inflated to minimal occlusive pressure)    Tube secured:  21    Secured at:  The teeth    Placement Verified By:  Capnometry    Complicating Factors:  None    Findings Post-Intubation:  Atraumatic/condition of teeth unchanged

## 2023-04-13 LAB
ESTROGEN SERPL-MCNC: NORMAL PG/ML
INSULIN SERPL-ACNC: NORMAL U[IU]/ML
LAB AP GROSS DESCRIPTION: NORMAL
LAB AP LABORATORY NOTES: NORMAL
T3RU NFR SERPL: NORMAL %

## 2023-04-17 ENCOUNTER — CLINICAL SUPPORT (OUTPATIENT)
Dept: FAMILY MEDICINE | Facility: CLINIC | Age: 28
End: 2023-04-17
Payer: MEDICAID

## 2023-04-17 DIAGNOSIS — Z11.1 PPD SCREENING TEST: Primary | ICD-10-CM

## 2023-04-17 PROCEDURE — 86580 TB INTRADERMAL TEST: CPT | Mod: ,,, | Performed by: NURSE PRACTITIONER

## 2023-04-17 PROCEDURE — 86580 POCT TB SKIN TEST: ICD-10-PCS | Mod: ,,, | Performed by: NURSE PRACTITIONER

## 2023-04-19 ENCOUNTER — CLINICAL SUPPORT (OUTPATIENT)
Dept: FAMILY MEDICINE | Facility: CLINIC | Age: 28
End: 2023-04-19
Payer: MEDICAID

## 2023-04-19 LAB
TB INDURATION - 48 HR READ: 0 MM
TB SKIN TEST - 48 HR READ: NEGATIVE

## 2023-05-08 PROBLEM — Z13.6 ENCOUNTER FOR SCREENING FOR CARDIOVASCULAR DISORDERS: Status: RESOLVED | Noted: 2022-04-11 | Resolved: 2023-05-08

## 2023-05-10 ENCOUNTER — CLINICAL SUPPORT (OUTPATIENT)
Dept: FAMILY MEDICINE | Facility: CLINIC | Age: 28
End: 2023-05-10
Payer: MEDICAID

## 2023-05-10 DIAGNOSIS — Z23 NEED FOR VACCINATION: Primary | ICD-10-CM

## 2023-05-10 PROCEDURE — 90472 IMMUNIZATION ADMIN EACH ADD: CPT | Mod: ,,, | Performed by: NURSE PRACTITIONER

## 2023-05-10 PROCEDURE — 90734 MENINGOCOCCAL CONJUGATE VACCINE 4-VALENT IM (MENVEO): ICD-10-PCS | Mod: ,,, | Performed by: NURSE PRACTITIONER

## 2023-05-10 PROCEDURE — 90471 IMMUNIZATION ADMIN: CPT | Mod: ,,, | Performed by: NURSE PRACTITIONER

## 2023-05-10 PROCEDURE — 90715 TDAP VACCINE 7 YRS/> IM: CPT | Mod: ,,, | Performed by: NURSE PRACTITIONER

## 2023-05-10 PROCEDURE — 90715 TDAP VACCINE GREATER THAN OR EQUAL TO 7YO IM: ICD-10-PCS | Mod: ,,, | Performed by: NURSE PRACTITIONER

## 2023-05-10 PROCEDURE — 90471 MENINGOCOCCAL CONJUGATE VACCINE 4-VALENT IM (MENVEO): ICD-10-PCS | Mod: ,,, | Performed by: NURSE PRACTITIONER

## 2023-05-10 PROCEDURE — 90734 MENACWYD/MENACWYCRM VACC IM: CPT | Mod: ,,, | Performed by: NURSE PRACTITIONER

## 2023-05-10 PROCEDURE — 90472 TDAP VACCINE GREATER THAN OR EQUAL TO 7YO IM: ICD-10-PCS | Mod: ,,, | Performed by: NURSE PRACTITIONER

## 2023-05-15 ENCOUNTER — OFFICE VISIT (OUTPATIENT)
Dept: FAMILY MEDICINE | Facility: CLINIC | Age: 28
End: 2023-05-15
Payer: MEDICAID

## 2023-05-15 VITALS
WEIGHT: 272.38 LBS | HEIGHT: 68 IN | OXYGEN SATURATION: 100 % | BODY MASS INDEX: 41.28 KG/M2 | TEMPERATURE: 98 F | HEART RATE: 73 BPM | DIASTOLIC BLOOD PRESSURE: 78 MMHG | SYSTOLIC BLOOD PRESSURE: 118 MMHG

## 2023-05-15 DIAGNOSIS — R05.1 ACUTE COUGH: ICD-10-CM

## 2023-05-15 DIAGNOSIS — J02.9 PHARYNGITIS, UNSPECIFIED ETIOLOGY: Primary | ICD-10-CM

## 2023-05-15 DIAGNOSIS — R50.9 FEVER, UNSPECIFIED FEVER CAUSE: ICD-10-CM

## 2023-05-15 DIAGNOSIS — J02.9 SORE THROAT: ICD-10-CM

## 2023-05-15 LAB
CTP QC/QA: YES
FLUAV AG NPH QL: NEGATIVE
FLUBV AG NPH QL: NEGATIVE
S PYO RRNA THROAT QL PROBE: NEGATIVE
SARS-COV-2 AG RESP QL IA.RAPID: NEGATIVE

## 2023-05-15 PROCEDURE — 3008F BODY MASS INDEX DOCD: CPT | Mod: ,,, | Performed by: NURSE PRACTITIONER

## 2023-05-15 PROCEDURE — 87880 POCT RAPID STREP A: ICD-10-PCS | Mod: QW,,, | Performed by: NURSE PRACTITIONER

## 2023-05-15 PROCEDURE — 87426 SARS CORONAVIRUS 2 ANTIGEN POCT: ICD-10-PCS | Mod: QW,,, | Performed by: NURSE PRACTITIONER

## 2023-05-15 PROCEDURE — 87426 SARSCOV CORONAVIRUS AG IA: CPT | Mod: QW,,, | Performed by: NURSE PRACTITIONER

## 2023-05-15 PROCEDURE — 87880 STREP A ASSAY W/OPTIC: CPT | Mod: QW,,, | Performed by: NURSE PRACTITIONER

## 2023-05-15 PROCEDURE — 3078F PR MOST RECENT DIASTOLIC BLOOD PRESSURE < 80 MM HG: ICD-10-PCS | Mod: ,,, | Performed by: NURSE PRACTITIONER

## 2023-05-15 PROCEDURE — 99213 OFFICE O/P EST LOW 20 MIN: CPT | Mod: ,,, | Performed by: NURSE PRACTITIONER

## 2023-05-15 PROCEDURE — 87804 INFLUENZA ASSAY W/OPTIC: CPT | Mod: 59,QW,, | Performed by: NURSE PRACTITIONER

## 2023-05-15 PROCEDURE — 3078F DIAST BP <80 MM HG: CPT | Mod: ,,, | Performed by: NURSE PRACTITIONER

## 2023-05-15 PROCEDURE — 87804 POCT INFLUENZA A/B: ICD-10-PCS | Mod: 59,QW,, | Performed by: NURSE PRACTITIONER

## 2023-05-15 PROCEDURE — 3074F SYST BP LT 130 MM HG: CPT | Mod: ,,, | Performed by: NURSE PRACTITIONER

## 2023-05-15 PROCEDURE — 99213 PR OFFICE/OUTPT VISIT, EST, LEVL III, 20-29 MIN: ICD-10-PCS | Mod: ,,, | Performed by: NURSE PRACTITIONER

## 2023-05-15 PROCEDURE — 3008F PR BODY MASS INDEX (BMI) DOCUMENTED: ICD-10-PCS | Mod: ,,, | Performed by: NURSE PRACTITIONER

## 2023-05-15 PROCEDURE — 3074F PR MOST RECENT SYSTOLIC BLOOD PRESSURE < 130 MM HG: ICD-10-PCS | Mod: ,,, | Performed by: NURSE PRACTITIONER

## 2023-05-15 RX ORDER — METHYLPREDNISOLONE 4 MG/1
TABLET ORAL
Qty: 21 TABLET | Refills: 0 | Status: SHIPPED | OUTPATIENT
Start: 2023-05-15 | End: 2023-06-28

## 2023-05-15 RX ORDER — AMOXICILLIN 500 MG/1
500 CAPSULE ORAL EVERY 12 HOURS
Qty: 14 CAPSULE | Refills: 0 | Status: SHIPPED | OUTPATIENT
Start: 2023-05-15 | End: 2023-05-22

## 2023-05-15 NOTE — PROGRESS NOTES
"   Johanny Freeman DNP   [address] [place] [address]     PATIENT NAME: Jing Hanson  : 1995  DATE: 5/15/23  MRN: [id number]      Billing Provider: Johanny Freeman DNP  Level of Service:   Patient PCP Information       Provider PCP Type    Johanny Freeman DNP General            Reason for Visit / Chief Complaint: Sore Throat and Migraine       Update PCP  Update Chief Complaint         History of Present Illness / Problem Focused Workflow     Jing Hanson presents to the clinic with Sore Throat and Migraine     Sore Throat     Migraine   Associated symptoms include a sore throat.     Review of Systems     Review of Systems   HENT:  Positive for sore throat.       Medical / Social / Family History     Past Medical History:   Diagnosis Date    Abnormal Pap smear of cervix        Past Surgical History:   Procedure Laterality Date    APPENDECTOMY      LAPAROSCOPIC CHOLECYSTECTOMY N/A 2023    Procedure: CHOLECYSTECTOMY, LAPAROSCOPIC;  Surgeon: Nikolay Johnson MD;  Location: [facility];  Service: General;  Laterality: N/A;    REPAIR, HERNIA, UMBILICAL N/A 2023    Procedure: REPAIR, HERNIA, UMBILICAL;  Surgeon: Nikolay Johnson MD;  Location: [facility];  Service: General;  Laterality: N/A;    TUBAL LIGATION         Social History  Ms.  reports that she has never smoked. She has never used smokeless tobacco. She reports current alcohol use. She reports that she does not use drugs.    Family History  Ms.'s family history includes Diabetes in her father and mother; Epilepsy in her mother; Hypertension in her father and mother.    Medications and Allergies     Medications  No outpatient medications have been marked as taking for the 5/15/23 encounter (Office Visit) with Johanny Freeman DNP.       Allergies  Review of patient's allergies indicates:  No Known Allergies    Physical Examination   /78   Pulse 73   Temp 97.9 °F (36.6 °C)   Ht 5' 8" (1.727 m)   Wt 123.6 kg " (272 lb 6.4 oz)   SpO2 100%   BMI 41.42 kg/m²    Physical Exam  Vitals and nursing note reviewed.   Constitutional:       Appearance: Normal appearance. She is ill-appearing.   HENT:      Head: Normocephalic.      Right Ear: Tympanic membrane normal.      Left Ear: Tympanic membrane normal.      Nose: Congestion and rhinorrhea present.      Mouth/Throat:      Mouth: Mucous membranes are moist.      Pharynx: Posterior oropharyngeal erythema present.   Eyes:      Extraocular Movements: Extraocular movements intact.      Conjunctiva/sclera: Conjunctivae normal.      Pupils: Pupils are equal, round, and reactive to light.   Cardiovascular:      Rate and Rhythm: Normal rate and regular rhythm.      Pulses: Normal pulses.      Heart sounds: Normal heart sounds.   Pulmonary:      Effort: Pulmonary effort is normal.      Breath sounds: Normal breath sounds.   Musculoskeletal:      Cervical back: Normal range of motion.   Lymphadenopathy:      Cervical: Cervical adenopathy present.   Skin:     General: Skin is warm and dry.      Capillary Refill: Capillary refill takes less than 2 seconds.   Neurological:      General: No focal deficit present.      Mental Status: She is alert and oriented to person, place, and time. Mental status is at baseline.   Psychiatric:         Mood and Affect: Mood normal.         Behavior: Behavior normal.         Thought Content: Thought content normal.         Judgment: Judgment normal.        Assessment and Plan (including Health Maintenance)      Problem List  Smart Sets  Document Outside HM   :    Plan:         Health Maintenance Due   Topic Date Due    COVID-19 Vaccine (1) Never done       Problem List Items Addressed This Visit          ENT    Pharyngitis - Primary       Pulmonary    Cough       Endocrine    BMI 40.0-44.9, adult       Other    Fever       Health Maintenance Topics with due status: Not Due       Topic Last Completion Date    Pap Smear 10/11/2022    TETANUS VACCINE  05/10/2023       No future appointments.         Signature:  Johanny Freeman, DNP      [address] [place] [address]    Date of encounter: 5/15/23

## 2023-05-15 NOTE — LETTER
May 15, 2023      [facility]  [address] [address]  Phone: [phone]  Fax: [phone]       Patient: Jing Hanson   YOB: 1995  Date of Visit: 05/15/2023    To Whom It May Concern:    Conor Hanson  was at [facility] on 05/15/2023. The patient may return to work/school on 5/16/2023 with no restrictions. If you have any questions or concerns, or if I can be of further assistance, please do not hesitate to contact me.    Sincerely,    Johanny Freeman, DNP

## 2023-06-28 ENCOUNTER — OFFICE VISIT (OUTPATIENT)
Dept: FAMILY MEDICINE | Facility: CLINIC | Age: 28
End: 2023-06-28
Payer: MEDICAID

## 2023-06-28 VITALS
BODY MASS INDEX: 42.13 KG/M2 | SYSTOLIC BLOOD PRESSURE: 110 MMHG | TEMPERATURE: 98 F | DIASTOLIC BLOOD PRESSURE: 74 MMHG | WEIGHT: 278 LBS | HEART RATE: 87 BPM | RESPIRATION RATE: 18 BRPM | HEIGHT: 68 IN | OXYGEN SATURATION: 100 %

## 2023-06-28 DIAGNOSIS — R03.0 ELEVATED BLOOD PRESSURE READING: ICD-10-CM

## 2023-06-28 DIAGNOSIS — Z79.899 ON LONG TERM DRUG THERAPY: ICD-10-CM

## 2023-06-28 DIAGNOSIS — K21.9 GASTROESOPHAGEAL REFLUX DISEASE, UNSPECIFIED WHETHER ESOPHAGITIS PRESENT: ICD-10-CM

## 2023-06-28 DIAGNOSIS — Z11.59 SCREENING FOR VIRAL DISEASE: ICD-10-CM

## 2023-06-28 DIAGNOSIS — N89.8 VAGINAL DISCHARGE: ICD-10-CM

## 2023-06-28 DIAGNOSIS — Z13.6 ENCOUNTER FOR SCREENING FOR CARDIOVASCULAR DISORDERS: ICD-10-CM

## 2023-06-28 DIAGNOSIS — R73.9 HYPERGLYCEMIA: ICD-10-CM

## 2023-06-28 DIAGNOSIS — Z11.3 SCREEN FOR STD (SEXUALLY TRANSMITTED DISEASE): Primary | ICD-10-CM

## 2023-06-28 LAB
25(OH)D3 SERPL-MCNC: 17.6 NG/ML
ALBUMIN SERPL BCP-MCNC: 3.3 G/DL (ref 3.5–5)
ALBUMIN/GLOB SERPL: 0.8 {RATIO}
ALP SERPL-CCNC: 108 U/L (ref 37–98)
ALT SERPL W P-5'-P-CCNC: 35 U/L (ref 13–56)
ANION GAP SERPL CALCULATED.3IONS-SCNC: 12 MMOL/L (ref 7–16)
AST SERPL W P-5'-P-CCNC: 20 U/L (ref 15–37)
BACTERIA VAG QL WET PREP: ABNORMAL /HPF
BILIRUB SERPL-MCNC: <0.1 MG/DL (ref ?–1.2)
BUN SERPL-MCNC: 16 MG/DL (ref 7–18)
BUN/CREAT SERPL: 24 (ref 6–20)
CALCIUM SERPL-MCNC: 9.2 MG/DL (ref 8.5–10.1)
CHLORIDE SERPL-SCNC: 108 MMOL/L (ref 98–107)
CHOLEST SERPL-MCNC: 174 MG/DL (ref 0–200)
CHOLEST/HDLC SERPL: 4.4 {RATIO}
CLUE CELLS VAG QL WET PREP: ABNORMAL /HPF
CO2 SERPL-SCNC: 24 MMOL/L (ref 21–32)
CREAT SERPL-MCNC: 0.66 MG/DL (ref 0.55–1.02)
EGFR (NO RACE VARIABLE) (RUSH/TITUS): 125 ML/MIN/1.73M2
FOLATE SERPL-MCNC: 9 NG/ML (ref 3.1–17.5)
GLOBULIN SER-MCNC: 3.9 G/DL (ref 2–4)
GLUCOSE SERPL-MCNC: 102 MG/DL (ref 74–106)
HAV IGM SER QL: NORMAL
HBV CORE IGM SER QL: NORMAL
HBV SURFACE AG SERPL QL IA: NORMAL
HCV AB SER QL: NORMAL
HDLC SERPL-MCNC: 40 MG/DL (ref 40–60)
HIV 1+O+2 AB SERPL QL: NORMAL
LDLC SERPL CALC-MCNC: 105 MG/DL
LDLC/HDLC SERPL: 2.6 {RATIO}
NONHDLC SERPL-MCNC: 134 MG/DL
POTASSIUM SERPL-SCNC: 4.5 MMOL/L (ref 3.5–5.1)
PROT SERPL-MCNC: 7.2 G/DL (ref 6.4–8.2)
RBC #/AREA VAG WET PREP: ABNORMAL /HPF
SODIUM SERPL-SCNC: 139 MMOL/L (ref 136–145)
SQUAMOUS EPITHELIALS WET WOUNT, GENITAL: ABNORMAL /HPF
SYPHILIS AB INTERPRETATION: NORMAL
T VAGINALIS VAG QL WET PREP: ABNORMAL /HPF
T4 FREE SERPL-MCNC: 0.86 NG/DL (ref 0.76–1.46)
TRIGL SERPL-MCNC: 146 MG/DL (ref 35–150)
TSH SERPL DL<=0.005 MIU/L-ACNC: 3.5 UIU/ML (ref 0.36–3.74)
VIT B12 SERPL-MCNC: 321 PG/ML (ref 193–986)
VLDLC SERPL-MCNC: 29 MG/DL
WBC CLUMPS WET MOUNT, GENITAL: ABNORMAL /HPF
WBC VAG QL WET PREP: ABNORMAL /HPF
YEAST VAG QL WET PREP: ABNORMAL /HPF

## 2023-06-28 PROCEDURE — 87491 CHLMYD TRACH DNA AMP PROBE: CPT | Mod: ,,, | Performed by: CLINICAL MEDICAL LABORATORY

## 2023-06-28 PROCEDURE — 87389 HIV-1 AG W/HIV-1&-2 AB AG IA: CPT | Mod: ,,, | Performed by: CLINICAL MEDICAL LABORATORY

## 2023-06-28 PROCEDURE — 84439 ASSAY OF FREE THYROXINE: CPT | Mod: ,,, | Performed by: CLINICAL MEDICAL LABORATORY

## 2023-06-28 PROCEDURE — 99213 OFFICE O/P EST LOW 20 MIN: CPT | Mod: ,,, | Performed by: NURSE PRACTITIONER

## 2023-06-28 PROCEDURE — 84443 ASSAY THYROID STIM HORMONE: CPT | Mod: ,,, | Performed by: CLINICAL MEDICAL LABORATORY

## 2023-06-28 PROCEDURE — 99213 PR OFFICE/OUTPT VISIT, EST, LEVL III, 20-29 MIN: ICD-10-PCS | Mod: ,,, | Performed by: NURSE PRACTITIONER

## 2023-06-28 PROCEDURE — 3078F DIAST BP <80 MM HG: CPT | Mod: ,,, | Performed by: NURSE PRACTITIONER

## 2023-06-28 PROCEDURE — 80061 LIPID PANEL: ICD-10-PCS | Mod: ,,, | Performed by: CLINICAL MEDICAL LABORATORY

## 2023-06-28 PROCEDURE — 3074F SYST BP LT 130 MM HG: CPT | Mod: ,,, | Performed by: NURSE PRACTITIONER

## 2023-06-28 PROCEDURE — 86696 HERPES SIMPLEX 1 & 2 IGG: ICD-10-PCS | Mod: ,,, | Performed by: CLINICAL MEDICAL LABORATORY

## 2023-06-28 PROCEDURE — 87591 CHLAMYDIA/GONORRHOEAE(GC), PCR: ICD-10-PCS | Mod: ,,, | Performed by: CLINICAL MEDICAL LABORATORY

## 2023-06-28 PROCEDURE — 80053 COMPREHENSIVE METABOLIC PANEL: ICD-10-PCS | Mod: ,,, | Performed by: CLINICAL MEDICAL LABORATORY

## 2023-06-28 PROCEDURE — 82306 VITAMIN D: ICD-10-PCS | Mod: ,,, | Performed by: CLINICAL MEDICAL LABORATORY

## 2023-06-28 PROCEDURE — 87491 CHLAMYDIA/GONORRHOEAE(GC), PCR: ICD-10-PCS | Mod: ,,, | Performed by: CLINICAL MEDICAL LABORATORY

## 2023-06-28 PROCEDURE — 86695 HERPES SIMPLEX 1 & 2 IGG: ICD-10-PCS | Mod: ,,, | Performed by: CLINICAL MEDICAL LABORATORY

## 2023-06-28 PROCEDURE — 84443 THYROID PANEL: ICD-10-PCS | Mod: ,,, | Performed by: CLINICAL MEDICAL LABORATORY

## 2023-06-28 PROCEDURE — 80053 COMPREHEN METABOLIC PANEL: CPT | Mod: ,,, | Performed by: CLINICAL MEDICAL LABORATORY

## 2023-06-28 PROCEDURE — 87661 TRICHOMONAS VAGINALIS BY PCR: ICD-10-PCS | Mod: ,,, | Performed by: CLINICAL MEDICAL LABORATORY

## 2023-06-28 PROCEDURE — 3008F PR BODY MASS INDEX (BMI) DOCUMENTED: ICD-10-PCS | Mod: ,,, | Performed by: NURSE PRACTITIONER

## 2023-06-28 PROCEDURE — 80074 ACUTE HEPATITIS PANEL: CPT | Mod: ,,, | Performed by: CLINICAL MEDICAL LABORATORY

## 2023-06-28 PROCEDURE — 86780 TREPONEMA PALLIDUM (SYPHILIS) ANTIBODY: ICD-10-PCS | Mod: ,,, | Performed by: CLINICAL MEDICAL LABORATORY

## 2023-06-28 PROCEDURE — 82306 VITAMIN D 25 HYDROXY: CPT | Mod: ,,, | Performed by: CLINICAL MEDICAL LABORATORY

## 2023-06-28 PROCEDURE — 3008F BODY MASS INDEX DOCD: CPT | Mod: ,,, | Performed by: NURSE PRACTITIONER

## 2023-06-28 PROCEDURE — 84439 THYROID PANEL: ICD-10-PCS | Mod: ,,, | Performed by: CLINICAL MEDICAL LABORATORY

## 2023-06-28 PROCEDURE — 87210 WET PREP, GENITAL: ICD-10-PCS | Mod: 59,,, | Performed by: CLINICAL MEDICAL LABORATORY

## 2023-06-28 PROCEDURE — 87389 HIV 1 / 2 ANTIBODY: ICD-10-PCS | Mod: ,,, | Performed by: CLINICAL MEDICAL LABORATORY

## 2023-06-28 PROCEDURE — 86780 TREPONEMA PALLIDUM: CPT | Mod: ,,, | Performed by: CLINICAL MEDICAL LABORATORY

## 2023-06-28 PROCEDURE — 87661 TRICHOMONAS VAGINALIS AMPLIF: CPT | Mod: ,,, | Performed by: CLINICAL MEDICAL LABORATORY

## 2023-06-28 PROCEDURE — 82746 ASSAY OF FOLIC ACID SERUM: CPT | Mod: ,,, | Performed by: CLINICAL MEDICAL LABORATORY

## 2023-06-28 PROCEDURE — 82607 VITAMIN B12/FOLATE, SERUM PANEL: ICD-10-PCS | Mod: ,,, | Performed by: CLINICAL MEDICAL LABORATORY

## 2023-06-28 PROCEDURE — 3074F PR MOST RECENT SYSTOLIC BLOOD PRESSURE < 130 MM HG: ICD-10-PCS | Mod: ,,, | Performed by: NURSE PRACTITIONER

## 2023-06-28 PROCEDURE — 3078F PR MOST RECENT DIASTOLIC BLOOD PRESSURE < 80 MM HG: ICD-10-PCS | Mod: ,,, | Performed by: NURSE PRACTITIONER

## 2023-06-28 PROCEDURE — 86696 HERPES SIMPLEX TYPE 2 TEST: CPT | Mod: ,,, | Performed by: CLINICAL MEDICAL LABORATORY

## 2023-06-28 PROCEDURE — 82607 VITAMIN B-12: CPT | Mod: ,,, | Performed by: CLINICAL MEDICAL LABORATORY

## 2023-06-28 PROCEDURE — 87591 N.GONORRHOEAE DNA AMP PROB: CPT | Mod: ,,, | Performed by: CLINICAL MEDICAL LABORATORY

## 2023-06-28 PROCEDURE — 80074 HEPATITIS PANEL, ACUTE: ICD-10-PCS | Mod: ,,, | Performed by: CLINICAL MEDICAL LABORATORY

## 2023-06-28 PROCEDURE — 86695 HERPES SIMPLEX TYPE 1 TEST: CPT | Mod: ,,, | Performed by: CLINICAL MEDICAL LABORATORY

## 2023-06-28 PROCEDURE — 87210 SMEAR WET MOUNT SALINE/INK: CPT | Mod: 59,,, | Performed by: CLINICAL MEDICAL LABORATORY

## 2023-06-28 PROCEDURE — 80061 LIPID PANEL: CPT | Mod: ,,, | Performed by: CLINICAL MEDICAL LABORATORY

## 2023-06-28 PROCEDURE — 82746 VITAMIN B12/FOLATE, SERUM PANEL: ICD-10-PCS | Mod: ,,, | Performed by: CLINICAL MEDICAL LABORATORY

## 2023-06-28 RX ORDER — FAMOTIDINE 20 MG/1
20 TABLET, FILM COATED ORAL 2 TIMES DAILY
Qty: 60 TABLET | Refills: 3 | Status: SHIPPED | OUTPATIENT
Start: 2023-06-28 | End: 2023-09-01

## 2023-06-28 NOTE — PROGRESS NOTES
"   Johanny Freeman DNP   [address] [place] [address]     PATIENT NAME: Jing Hanson  : 1995  DATE: 23  MRN: [id number]      Billing Provider: Johanny Freeman DNP  Level of Service:   Patient PCP Information       Provider PCP Type    Johanny Freeman DNP General            Reason for Visit / Chief Complaint: Vaginal Pain       Update PCP  Update Chief Complaint         History of Present Illness / Problem Focused Workflow     Jing Hanson presents to the clinic with Vaginal Pain     Vaginal Pain      Review of Systems     Review of Systems   Genitourinary:  Positive for vaginal pain.      Medical / Social / Family History     Past Medical History:   Diagnosis Date    Abnormal Pap smear of cervix        Past Surgical History:   Procedure Laterality Date    APPENDECTOMY      LAPAROSCOPIC CHOLECYSTECTOMY N/A 2023    Procedure: CHOLECYSTECTOMY, LAPAROSCOPIC;  Surgeon: Nikolay Johnson MD;  Location: [facility];  Service: General;  Laterality: N/A;    REPAIR, HERNIA, UMBILICAL N/A 2023    Procedure: REPAIR, HERNIA, UMBILICAL;  Surgeon: Nikolay Johnson MD;  Location: [facility];  Service: General;  Laterality: N/A;    TUBAL LIGATION         Social History  Ms.  reports that she has never smoked. She has never used smokeless tobacco. She reports current alcohol use. She reports that she does not use drugs.    Family History  Ms.'s family history includes Diabetes in her father and mother; Epilepsy in her mother; Hypertension in her father and mother.    Medications and Allergies     Medications  No outpatient medications have been marked as taking for the 23 encounter (Office Visit) with Johanny Freeman DNP.       Allergies  Review of patient's allergies indicates:  No Known Allergies    Physical Examination   /74 (BP Location: Left arm, Patient Position: Sitting, BP Method: Medium (Automatic))   Pulse 87   Temp 98.1 °F (36.7 °C) (Oral)   Resp 18   Ht 5' 8" " (1.727 m)   Wt 126.1 kg (278 lb)   SpO2 100%   BMI 42.27 kg/m²    Physical Exam  Vitals and nursing note reviewed.   HENT:      Head: Normocephalic.      Nose: Nose normal.      Mouth/Throat:      Mouth: Mucous membranes are moist.   Eyes:      Extraocular Movements: Extraocular movements intact.      Conjunctiva/sclera: Conjunctivae normal.      Pupils: Pupils are equal, round, and reactive to light.   Cardiovascular:      Rate and Rhythm: Normal rate and regular rhythm.      Pulses: Normal pulses.      Heart sounds: Normal heart sounds.   Pulmonary:      Effort: Pulmonary effort is normal.      Breath sounds: Normal breath sounds.   Abdominal:      General: Abdomen is flat. Bowel sounds are normal.      Tenderness: There is abdominal tenderness.      Comments: Mid epigastric pain comes and goes.   Genitourinary:     Vagina: Vaginal discharge present.   Musculoskeletal:      Cervical back: Normal range of motion.   Skin:     General: Skin is warm and dry.      Capillary Refill: Capillary refill takes less than 2 seconds.   Neurological:      General: No focal deficit present.      Mental Status: She is alert and oriented to person, place, and time. Mental status is at baseline.   Psychiatric:         Mood and Affect: Mood normal.         Behavior: Behavior normal.         Thought Content: Thought content normal.         Judgment: Judgment normal.        Assessment and Plan (including Health Maintenance)      Problem List  Smart Sets  Document Outside HM   :    Plan:   States has been on a fruit and vegie diet. Discussed acidic foods with her.   Increase meats grilled or baked.       Health Maintenance Due   Topic Date Due    COVID-19 Vaccine (1) Never done       Problem List Items Addressed This Visit          Cardiac/Vascular    Elevated blood pressure reading    Relevant Orders    CBC Auto Differential    Comprehensive Metabolic Panel    Encounter for screening for cardiovascular disorders - Primary     Relevant Orders    Lipid Panel       Renal/    Vaginal discharge       ID    Screening for viral disease       Endocrine    BMI 35.0-35.9,adult    Hyperglycemia    Relevant Orders    Hemoglobin A1C       GI    Gastroesophageal reflux disease       Palliative Care    On long term drug therapy    Relevant Orders    Vitamin D    Vitamin B12 & Folate    Thyroid Panel       Health Maintenance Topics with due status: Not Due       Topic Last Completion Date    Pap Smear 10/11/2022    TETANUS VACCINE 05/10/2023       No future appointments.         Signature:  Johanny Freeman, CANDY      [address] [place] [address]    Date of encounter: 6/28/23

## 2023-06-29 LAB
CHLAMYDIA BY PCR: POSITIVE
HSV TYPE 1 AB IGG INDEX: 3.91
HSV TYPE 2 AB IGG INDEX: 4.17
HSV1 IGG SER QL: POSITIVE
HSV2 IGG SER QL: POSITIVE
N. GONORRHOEAE (GC) BY PCR: NEGATIVE
TRICHOMONAS NAT: POSITIVE

## 2023-06-30 ENCOUNTER — OFFICE VISIT (OUTPATIENT)
Dept: FAMILY MEDICINE | Facility: CLINIC | Age: 28
End: 2023-06-30
Payer: MEDICAID

## 2023-06-30 VITALS
HEIGHT: 68 IN | OXYGEN SATURATION: 98 % | WEIGHT: 278 LBS | SYSTOLIC BLOOD PRESSURE: 137 MMHG | HEART RATE: 83 BPM | DIASTOLIC BLOOD PRESSURE: 84 MMHG | RESPIRATION RATE: 18 BRPM | BODY MASS INDEX: 42.13 KG/M2

## 2023-06-30 DIAGNOSIS — A74.9 CHLAMYDIA: ICD-10-CM

## 2023-06-30 DIAGNOSIS — A59.9 TRICHIMONIASIS: Primary | ICD-10-CM

## 2023-06-30 DIAGNOSIS — B00.9 HERPES: ICD-10-CM

## 2023-06-30 PROCEDURE — 99212 OFFICE O/P EST SF 10 MIN: CPT | Mod: ,,, | Performed by: NURSE PRACTITIONER

## 2023-06-30 PROCEDURE — 3008F PR BODY MASS INDEX (BMI) DOCUMENTED: ICD-10-PCS | Mod: ,,, | Performed by: NURSE PRACTITIONER

## 2023-06-30 PROCEDURE — 3008F BODY MASS INDEX DOCD: CPT | Mod: ,,, | Performed by: NURSE PRACTITIONER

## 2023-06-30 PROCEDURE — 99212 PR OFFICE/OUTPT VISIT, EST, LEVL II, 10-19 MIN: ICD-10-PCS | Mod: ,,, | Performed by: NURSE PRACTITIONER

## 2023-06-30 RX ORDER — AZITHROMYCIN 250 MG/1
250 TABLET, FILM COATED ORAL
Status: COMPLETED | OUTPATIENT
Start: 2023-06-30 | End: 2023-06-30

## 2023-06-30 RX ORDER — METRONIDAZOLE 500 MG/1
500 TABLET ORAL EVERY 12 HOURS
Qty: 14 TABLET | Refills: 0 | Status: SHIPPED | OUTPATIENT
Start: 2023-06-30 | End: 2023-09-01

## 2023-06-30 RX ADMIN — AZITHROMYCIN 250 MG: 250 TABLET, FILM COATED ORAL at 08:06

## 2023-06-30 NOTE — PROGRESS NOTES
"Subjective:      Jing Hanson is a 27 y.o. female who presents for STD follow up with positive labs.  She denies any unusual discharge. She denies any abnormal vaginal bleeding.  States has a new partner.       Review of Systems  Pertinent ROS negative     Objective:      /84 (BP Location: Left arm, Patient Position: Sitting, BP Method: Large (Automatic))   Pulse 83   Resp 18   Ht 5' 8" (1.727 m)   Wt 126.1 kg (278 lb)   SpO2 98%   BMI 42.27 kg/m²   General:   No distress   Pelvic:  Deferred, denies ss        Assessment:     Encounter Diagnoses   Name Primary?    BMI 40.0-44.9, adult     Trichimoniasis Yes    Chlamydia     Herpes           Plan:   1g Azithroymcin given in clinic  Rx Flagyl bid x7 days   Have partner tested and treated  Discussed safe sexual practice in detail  Avoid intercourse for 7-10 days after pt and partner are treated  Avoid irritants, douching, feminine hygiene products  Return to clinic in 2 weeks for repeat STD testing to ensure it has resolved.      "

## 2023-07-14 ENCOUNTER — OFFICE VISIT (OUTPATIENT)
Dept: FAMILY MEDICINE | Facility: CLINIC | Age: 28
End: 2023-07-14
Payer: MEDICAID

## 2023-07-14 VITALS
TEMPERATURE: 98 F | RESPIRATION RATE: 16 BRPM | WEIGHT: 274 LBS | HEIGHT: 68 IN | DIASTOLIC BLOOD PRESSURE: 85 MMHG | SYSTOLIC BLOOD PRESSURE: 131 MMHG | BODY MASS INDEX: 41.52 KG/M2 | OXYGEN SATURATION: 99 % | HEART RATE: 89 BPM

## 2023-07-14 DIAGNOSIS — A59.9 TRICHIMONIASIS: Primary | ICD-10-CM

## 2023-07-14 DIAGNOSIS — A74.9 CHLAMYDIA: ICD-10-CM

## 2023-07-14 PROCEDURE — 87210 WET PREP, GENITAL: ICD-10-PCS | Mod: XU,,, | Performed by: CLINICAL MEDICAL LABORATORY

## 2023-07-14 PROCEDURE — 3008F PR BODY MASS INDEX (BMI) DOCUMENTED: ICD-10-PCS | Mod: ,,, | Performed by: NURSE PRACTITIONER

## 2023-07-14 PROCEDURE — 87210 SMEAR WET MOUNT SALINE/INK: CPT | Mod: XU,,, | Performed by: CLINICAL MEDICAL LABORATORY

## 2023-07-14 PROCEDURE — 87801 CHLAMYDIA/GONORRHOEAE(GC), PCR: ICD-10-PCS | Mod: ,,, | Performed by: CLINICAL MEDICAL LABORATORY

## 2023-07-14 PROCEDURE — 87801 DETECT AGNT MULT DNA AMPLI: CPT | Mod: ,,, | Performed by: CLINICAL MEDICAL LABORATORY

## 2023-07-14 PROCEDURE — 99212 PR OFFICE/OUTPT VISIT, EST, LEVL II, 10-19 MIN: ICD-10-PCS | Mod: ,,, | Performed by: NURSE PRACTITIONER

## 2023-07-14 PROCEDURE — 87661 TRICHOMONAS VAGINALIS BY PCR: ICD-10-PCS | Mod: XU,,, | Performed by: CLINICAL MEDICAL LABORATORY

## 2023-07-14 PROCEDURE — 87661 TRICHOMONAS VAGINALIS AMPLIF: CPT | Mod: XU,,, | Performed by: CLINICAL MEDICAL LABORATORY

## 2023-07-14 PROCEDURE — 99212 OFFICE O/P EST SF 10 MIN: CPT | Mod: ,,, | Performed by: NURSE PRACTITIONER

## 2023-07-14 PROCEDURE — 3008F BODY MASS INDEX DOCD: CPT | Mod: ,,, | Performed by: NURSE PRACTITIONER

## 2023-07-14 NOTE — PROGRESS NOTES
"Subjective:      Jing Hanson is a 27 y.o. female who presents for STD follow up after taking all antibiotics for +chlamydia and trich.  She denies any unusual discharge. She denies any abnormal vaginal bleeding.        Review of Systems  Pertinent ROS negative     Objective:      /85 (BP Location: Left arm, Patient Position: Sitting, BP Method: Medium (Automatic))   Pulse 89   Temp 97.9 °F (36.6 °C) (Oral)   Resp 16   Ht 5' 8" (1.727 m)   Wt 124.3 kg (274 lb)   SpO2 99%   BMI 41.66 kg/m²   General:   No distress   Pelvic:  Deferred, denies ss        Assessment:     Encounter Diagnoses   Name Primary?    Trichimoniasis Yes    Chlamydia     BMI 40.0-44.9, adult           Plan:   Pending repeat labs.     Discussed safe sexual practice in detail  Avoid intercourse for 7-10 days after pt and partner are treated  Avoid irritants, douching, feminine hygiene products        "

## 2023-07-16 LAB
CHLAMYDIA BY PCR: POSITIVE
N. GONORRHOEAE (GC) BY PCR: NEGATIVE
TRICHOMONAS NAT: POSITIVE

## 2023-07-17 RX ORDER — AZITHROMYCIN 1 G/1
1 POWDER, FOR SUSPENSION ORAL ONCE
Qty: 1 PACKET | Refills: 0 | Status: SHIPPED | OUTPATIENT
Start: 2023-07-17 | End: 2023-07-17

## 2023-07-17 RX ORDER — TINIDAZOLE 500 MG/1
2 TABLET ORAL ONCE
Qty: 4 TABLET | Refills: 0 | Status: SHIPPED | OUTPATIENT
Start: 2023-07-17 | End: 2023-07-17

## 2023-08-16 ENCOUNTER — OFFICE VISIT (OUTPATIENT)
Dept: FAMILY MEDICINE | Facility: CLINIC | Age: 28
End: 2023-08-16
Payer: MEDICAID

## 2023-08-16 VITALS
DIASTOLIC BLOOD PRESSURE: 80 MMHG | BODY MASS INDEX: 42.28 KG/M2 | TEMPERATURE: 98 F | SYSTOLIC BLOOD PRESSURE: 115 MMHG | OXYGEN SATURATION: 98 % | WEIGHT: 279 LBS | HEART RATE: 80 BPM | HEIGHT: 68 IN

## 2023-08-16 DIAGNOSIS — A59.9 TRICHIMONIASIS: Primary | ICD-10-CM

## 2023-08-16 DIAGNOSIS — A74.9 CHLAMYDIA: ICD-10-CM

## 2023-08-16 PROCEDURE — 3008F PR BODY MASS INDEX (BMI) DOCUMENTED: ICD-10-PCS | Mod: ,,, | Performed by: NURSE PRACTITIONER

## 2023-08-16 PROCEDURE — 99212 PR OFFICE/OUTPT VISIT, EST, LEVL II, 10-19 MIN: ICD-10-PCS | Mod: ,,, | Performed by: NURSE PRACTITIONER

## 2023-08-16 PROCEDURE — 87661 TRICHOMONAS VAGINALIS BY PCR: ICD-10-PCS | Mod: ,,, | Performed by: CLINICAL MEDICAL LABORATORY

## 2023-08-16 PROCEDURE — 87591 N.GONORRHOEAE DNA AMP PROB: CPT | Mod: ,,, | Performed by: CLINICAL MEDICAL LABORATORY

## 2023-08-16 PROCEDURE — 3074F PR MOST RECENT SYSTOLIC BLOOD PRESSURE < 130 MM HG: ICD-10-PCS | Mod: ,,, | Performed by: NURSE PRACTITIONER

## 2023-08-16 PROCEDURE — 87591 CHLAMYDIA/GONORRHOEAE(GC), PCR: ICD-10-PCS | Mod: ,,, | Performed by: CLINICAL MEDICAL LABORATORY

## 2023-08-16 PROCEDURE — 3074F SYST BP LT 130 MM HG: CPT | Mod: ,,, | Performed by: NURSE PRACTITIONER

## 2023-08-16 PROCEDURE — 3008F BODY MASS INDEX DOCD: CPT | Mod: ,,, | Performed by: NURSE PRACTITIONER

## 2023-08-16 PROCEDURE — 87491 CHLMYD TRACH DNA AMP PROBE: CPT | Mod: ,,, | Performed by: CLINICAL MEDICAL LABORATORY

## 2023-08-16 PROCEDURE — 99212 OFFICE O/P EST SF 10 MIN: CPT | Mod: ,,, | Performed by: NURSE PRACTITIONER

## 2023-08-16 PROCEDURE — 87491 CHLAMYDIA/GONORRHOEAE(GC), PCR: ICD-10-PCS | Mod: ,,, | Performed by: CLINICAL MEDICAL LABORATORY

## 2023-08-16 PROCEDURE — 87661 TRICHOMONAS VAGINALIS AMPLIF: CPT | Mod: ,,, | Performed by: CLINICAL MEDICAL LABORATORY

## 2023-08-16 NOTE — PROGRESS NOTES
"Subjective:      Jing Hanson is a 27 y.o. female who presents for STD follow up.  She denies any unusual discharge. She denies any abnormal vaginal bleeding.        Review of Systems  Pertinent ROS negative     Objective:      /80   Pulse 80   Temp 97.8 °F (36.6 °C)   Ht 5' 8" (1.727 m)   Wt 126.6 kg (279 lb)   LMP 08/07/2023   SpO2 98%   BMI 42.42 kg/m²   General:   No distress   Pelvic:  Deferred, denies ss        Assessment:     Encounter Diagnoses   Name Primary?    Trichimoniasis Yes    Chlamydia     BMI 35.0-35.9,adult           Plan:   Pending labs.   Discussed safe sexual practice in detail  Avoid intercourse for 7-10 days after pt and partner are treated  Avoid irritants, douching, feminine hygiene products          "

## 2023-08-17 LAB
CHLAMYDIA BY PCR: NEGATIVE
N. GONORRHOEAE (GC) BY PCR: NEGATIVE
TRICHOMONAS NAT: NEGATIVE

## 2023-08-24 ENCOUNTER — OFFICE VISIT (OUTPATIENT)
Dept: FAMILY MEDICINE | Facility: CLINIC | Age: 28
End: 2023-08-24
Payer: MEDICAID

## 2023-08-24 VITALS
WEIGHT: 279 LBS | HEIGHT: 68 IN | DIASTOLIC BLOOD PRESSURE: 83 MMHG | SYSTOLIC BLOOD PRESSURE: 122 MMHG | HEART RATE: 76 BPM | OXYGEN SATURATION: 97 % | TEMPERATURE: 98 F | BODY MASS INDEX: 42.28 KG/M2

## 2023-08-24 DIAGNOSIS — N89.8 VAGINAL DISCHARGE: ICD-10-CM

## 2023-08-24 DIAGNOSIS — N76.0 ACUTE VAGINITIS: ICD-10-CM

## 2023-08-24 DIAGNOSIS — B37.31 CANDIDIASIS, VAGINA: Primary | ICD-10-CM

## 2023-08-24 PROCEDURE — 87801 PR  DETECT AGENT, MULT ORGS, DNA, AMP: ICD-10-PCS | Mod: ,,, | Performed by: CLINICAL MEDICAL LABORATORY

## 2023-08-24 PROCEDURE — 3008F BODY MASS INDEX DOCD: CPT | Mod: ,,, | Performed by: NURSE PRACTITIONER

## 2023-08-24 PROCEDURE — 87661 TRICHOMONAS VAGINALIS AMPLIF: CPT | Mod: XU,,, | Performed by: CLINICAL MEDICAL LABORATORY

## 2023-08-24 PROCEDURE — 87661 TRICHOMONAS VAGINALIS BY PCR: ICD-10-PCS | Mod: XU,,, | Performed by: CLINICAL MEDICAL LABORATORY

## 2023-08-24 PROCEDURE — 87801 DETECT AGNT MULT DNA AMPLI: CPT | Mod: ,,, | Performed by: CLINICAL MEDICAL LABORATORY

## 2023-08-24 PROCEDURE — 99213 PR OFFICE/OUTPT VISIT, EST, LEVL III, 20-29 MIN: ICD-10-PCS | Mod: ,,, | Performed by: NURSE PRACTITIONER

## 2023-08-24 PROCEDURE — 99213 OFFICE O/P EST LOW 20 MIN: CPT | Mod: ,,, | Performed by: NURSE PRACTITIONER

## 2023-08-24 PROCEDURE — 3074F SYST BP LT 130 MM HG: CPT | Mod: ,,, | Performed by: NURSE PRACTITIONER

## 2023-08-24 PROCEDURE — 87210 SMEAR WET MOUNT SALINE/INK: CPT | Mod: XU,,, | Performed by: CLINICAL MEDICAL LABORATORY

## 2023-08-24 PROCEDURE — 3008F PR BODY MASS INDEX (BMI) DOCUMENTED: ICD-10-PCS | Mod: ,,, | Performed by: NURSE PRACTITIONER

## 2023-08-24 PROCEDURE — 87210 WET PREP, GENITAL: ICD-10-PCS | Mod: XU,,, | Performed by: CLINICAL MEDICAL LABORATORY

## 2023-08-24 PROCEDURE — 3074F PR MOST RECENT SYSTOLIC BLOOD PRESSURE < 130 MM HG: ICD-10-PCS | Mod: ,,, | Performed by: NURSE PRACTITIONER

## 2023-08-24 RX ORDER — FLUCONAZOLE 100 MG/1
100 TABLET ORAL DAILY
Qty: 5 TABLET | Refills: 0 | Status: SHIPPED | OUTPATIENT
Start: 2023-08-24 | End: 2023-08-29

## 2023-08-24 NOTE — PROGRESS NOTES
"Jing Hanson is a 27 y.o. female No obstetric history on file. presents with complaint of vaginal discharge for 1 week.  She reports itching.  reports odor.  She states the discharge is white.  Patient's last menstrual period was 08/07/2023..    Past Medical History:   Diagnosis Date    Abnormal Pap smear of cervix      Past Surgical History:   Procedure Laterality Date    APPENDECTOMY      LAPAROSCOPIC CHOLECYSTECTOMY N/A 4/12/2023    Procedure: CHOLECYSTECTOMY, LAPAROSCOPIC;  Surgeon: Nikolay Johnson MD;  Location: [facility];  Service: General;  Laterality: N/A;    REPAIR, HERNIA, UMBILICAL N/A 4/12/2023    Procedure: REPAIR, HERNIA, UMBILICAL;  Surgeon: Nikolay Johnson MD;  Location: [facility] OR;  Service: General;  Laterality: N/A;    TUBAL LIGATION       OB History   No obstetric history on file.         ROS:  GENERAL: No fever, chills, fatigability or weight loss.  ABDOMEN: No abdominal pain. Denies nausea. Denies vomiting. No diarrhea. No constipation  BREAST: Denies pain. No lumps. No discharge.  URINARY: No incontinence, no nocturia, no frequency and no dysuria.  CARDIOVASCULAR: No chest pain. No shortness of breath. No leg cramps.  NEUROLOGICAL: No headaches. No vision changes.  : See HPI    Vitals:    08/24/23 0824   BP: 122/83   Pulse: 76   Temp: 97.6 °F (36.4 °C)   SpO2: 97%   Weight: 126.6 kg (279 lb)   Height: 5' 8" (1.727 m)         PHYSICAL EXAM:   General : normal appearance  Resp: clear bilaterally  Cards: s1, s2, no murmurs.  GI: soft, nontender, nondistended  : discharge, odor  Vagina- without lesions, with discharge, without erythema, without ulcers        ASSESSMENT and PLAN:  Candidiasis, vagina    Acute vaginitis  -     Wet Prep, Genital  -     POCT URINALYSIS W/O SCOPE  -     Chlamydia/GC, PCR  -     Trichomonas vaginalis by PCR    BMI 35.0-35.9,adult    Vaginal discharge    Other orders  -     fluconazole (DIFLUCAN) 100 MG tablet; Take 1 tablet (100 mg total) by mouth once daily. for 5 " days  Dispense: 5 tablet; Refill: 0        Pending labs  Sent medication  F/u 1 month for repeat swabs      FOLLOW UP: PRN lack of improvement.

## 2023-08-25 LAB
CHLAMYDIA BY PCR: NEGATIVE
N. GONORRHOEAE (GC) BY PCR: NEGATIVE
TRICHOMONAS NAT: NEGATIVE

## 2023-09-01 ENCOUNTER — OFFICE VISIT (OUTPATIENT)
Dept: FAMILY MEDICINE | Facility: CLINIC | Age: 28
End: 2023-09-01
Payer: MEDICAID

## 2023-09-01 VITALS
WEIGHT: 278 LBS | TEMPERATURE: 99 F | SYSTOLIC BLOOD PRESSURE: 110 MMHG | DIASTOLIC BLOOD PRESSURE: 77 MMHG | HEART RATE: 79 BPM | BODY MASS INDEX: 42.13 KG/M2 | RESPIRATION RATE: 16 BRPM | OXYGEN SATURATION: 98 % | HEIGHT: 68 IN

## 2023-09-01 DIAGNOSIS — U07.1 COVID-19: ICD-10-CM

## 2023-09-01 DIAGNOSIS — J31.0 RHINITIS, UNSPECIFIED TYPE: ICD-10-CM

## 2023-09-01 DIAGNOSIS — J02.9 SORETHROAT: Primary | ICD-10-CM

## 2023-09-01 LAB
CTP QC/QA: YES
SARS-COV-2 AG RESP QL IA.RAPID: POSITIVE

## 2023-09-01 PROCEDURE — 3078F PR MOST RECENT DIASTOLIC BLOOD PRESSURE < 80 MM HG: ICD-10-PCS | Mod: ,,, | Performed by: NURSE PRACTITIONER

## 2023-09-01 PROCEDURE — 87426 SARS CORONAVIRUS 2 ANTIGEN POCT: ICD-10-PCS | Mod: QW,,, | Performed by: NURSE PRACTITIONER

## 2023-09-01 PROCEDURE — 87426 SARSCOV CORONAVIRUS AG IA: CPT | Mod: QW,,, | Performed by: NURSE PRACTITIONER

## 2023-09-01 PROCEDURE — 3074F PR MOST RECENT SYSTOLIC BLOOD PRESSURE < 130 MM HG: ICD-10-PCS | Mod: ,,, | Performed by: NURSE PRACTITIONER

## 2023-09-01 PROCEDURE — 3078F DIAST BP <80 MM HG: CPT | Mod: ,,, | Performed by: NURSE PRACTITIONER

## 2023-09-01 PROCEDURE — 99212 PR OFFICE/OUTPT VISIT, EST, LEVL II, 10-19 MIN: ICD-10-PCS | Mod: ,,, | Performed by: NURSE PRACTITIONER

## 2023-09-01 PROCEDURE — 3008F PR BODY MASS INDEX (BMI) DOCUMENTED: ICD-10-PCS | Mod: ,,, | Performed by: NURSE PRACTITIONER

## 2023-09-01 PROCEDURE — 3008F BODY MASS INDEX DOCD: CPT | Mod: ,,, | Performed by: NURSE PRACTITIONER

## 2023-09-01 PROCEDURE — 99212 OFFICE O/P EST SF 10 MIN: CPT | Mod: ,,, | Performed by: NURSE PRACTITIONER

## 2023-09-01 PROCEDURE — 3074F SYST BP LT 130 MM HG: CPT | Mod: ,,, | Performed by: NURSE PRACTITIONER

## 2023-09-01 RX ORDER — FLUTICASONE PROPIONATE 50 MCG
1 SPRAY, SUSPENSION (ML) NASAL DAILY
Qty: 16 G | Refills: 3 | Status: SHIPPED | OUTPATIENT
Start: 2023-09-01

## 2023-09-01 RX ORDER — CETIRIZINE HYDROCHLORIDE 10 MG/1
10 TABLET ORAL DAILY
Qty: 30 TABLET | Refills: 11 | Status: SHIPPED | OUTPATIENT
Start: 2023-09-01 | End: 2024-08-31

## 2023-09-01 RX ORDER — AZITHROMYCIN 250 MG/1
TABLET, FILM COATED ORAL
Qty: 6 TABLET | Refills: 0 | Status: SHIPPED | OUTPATIENT
Start: 2023-09-01 | End: 2023-09-06

## 2023-09-01 NOTE — PROGRESS NOTES
ISRAEL Young   [facility] [address] [place] [address]  [phone]      PATIENT NAME: Jing Hanson  : 1995  DATE: 23  MRN: [id number]      Billing Provider: ISRAEL Young  Level of Service:   Patient PCP Information       Provider PCP Type    Johanny Freeman DNP General            Reason for Visit / Chief Complaint: Rhinitis, Sore Throat, and Generalized Body Aches       Update PCP  Update Chief Complaint         History of Present Illness / Problem Focused Workflow     Jing Hanson presents to the clinic with Rhinitis, Sore Throat, and Generalized Body Aches     Pt presents with complaints of sinus congestion, pressure and cough. Pt reports subjective fever and body aches with headache.     COVID positive     Recommend change toothbrush and bed linens  Avoid sharing food or drinks  Encourage oral hydration.     Advised to quarantine per updated CDC guidelines.  Advised COVID-19 is a virus and viruses do not respond to antibiotics nor do antibiotics prevent a bacterial infection from developing.   Advised steroids are not recommended for outpatient management of covid symptoms except in certain extreme or special circumstances for patients who require hospitalization or supplemental oxygen.      Treatment for COVID-19 is primarily supportive including rest, increase fluids, and the use of OTC meds to help alleviate symptoms.    For immune support take vitamin C, vitamin D, and zinc 50 mg daily.  Acetaminophen or ibuprofen as needed for body aches, headache, or fever.  Home O2 sat monitoring can be used if experiencing shortness of breath.   Advised to report to ED for evaluation of any new or worsening chest pain, shortness of breath, or other severe symptoms.  Call the office if you have other questions or concerns that arise.          Review of Systems     Review of Systems    Constitutional:  Negative for chills, fatigue and fever.   HENT:  Positive for nasal congestion, postnasal drip and sinus pressure/congestion. Negative for sore throat.    Respiratory:  Negative for cough, shortness of breath and wheezing.    Cardiovascular:  Negative for chest pain.   Gastrointestinal:  Negative for nausea and vomiting.   Neurological:  Negative for weakness and headaches.        Medical / Social / Family History     Past Medical History:   Diagnosis Date    Abnormal Pap smear of cervix        Past Surgical History:   Procedure Laterality Date    APPENDECTOMY      LAPAROSCOPIC CHOLECYSTECTOMY N/A 4/12/2023    Procedure: CHOLECYSTECTOMY, LAPAROSCOPIC;  Surgeon: Nikolay Johnson MD;  Location: [facility];  Service: General;  Laterality: N/A;    REPAIR, HERNIA, UMBILICAL N/A 4/12/2023    Procedure: REPAIR, HERNIA, UMBILICAL;  Surgeon: Nikolay Johnson MD;  Location: [facility];  Service: General;  Laterality: N/A;    TUBAL LIGATION         Social History  Ms. Hanson  reports that she has never smoked. She has never been exposed to tobacco smoke. She has never used smokeless tobacco. She reports current alcohol use. She reports that she does not use drugs.    Family History  Ms. Hanson's family history includes Diabetes in her father and mother; Epilepsy in her mother; Hypertension in her father and mother.    Medications and Allergies     Medications  No outpatient medications have been marked as taking for the 9/1/23 encounter (Office Visit) with Johanny Yao ACNP.       Allergies  Review of patient's allergies indicates:  No Known Allergies    Physical Examination     Vitals:    09/01/23 0824   BP: 110/77   Pulse: 79   Resp: 16   Temp: 98.7 °F (37.1 °C)     Physical Exam  HENT:      Nose: Congestion present.   Eyes:      Pupils: Pupils are equal, round, and reactive to light.   Cardiovascular:      Rate and Rhythm: Normal rate and regular rhythm.      Heart sounds: Normal heart sounds. No  murmur heard.  Pulmonary:      Breath sounds: Normal breath sounds. No wheezing, rhonchi or rales.   Abdominal:      General: Bowel sounds are normal.   Musculoskeletal:         General: No swelling.      Cervical back: Normal range of motion and neck supple.   Skin:     General: Skin is warm and dry.   Neurological:      Mental Status: She is alert and oriented to person, place, and time.          Lab Results   Component Value Date    WBC 9.34 04/10/2023    HGB 11.4 (L) 04/10/2023    HCT 36.9 (L) 04/10/2023    MCV 84.2 04/10/2023     04/10/2023        Sodium   Date Value Ref Range Status   06/28/2023 139 136 - 145 mmol/L Final     Potassium   Date Value Ref Range Status   06/28/2023 4.5 3.5 - 5.1 mmol/L Final     Chloride   Date Value Ref Range Status   06/28/2023 108 (H) 98 - 107 mmol/L Final     CO2   Date Value Ref Range Status   06/28/2023 24 21 - 32 mmol/L Final     Glucose   Date Value Ref Range Status   06/28/2023 102 74 - 106 mg/dL Final     BUN   Date Value Ref Range Status   06/28/2023 16 7 - 18 mg/dL Final     Creatinine   Date Value Ref Range Status   06/28/2023 0.66 0.55 - 1.02 mg/dL Final     Calcium   Date Value Ref Range Status   06/28/2023 9.2 8.5 - 10.1 mg/dL Final     Total Protein   Date Value Ref Range Status   06/28/2023 7.2 6.4 - 8.2 g/dL Final     Albumin   Date Value Ref Range Status   06/28/2023 3.3 (L) 3.5 - 5.0 g/dL Final     Bilirubin, Total   Date Value Ref Range Status   06/28/2023 <0.1 >0.0 - 1.2 mg/dL Final     Alk Phos   Date Value Ref Range Status   06/28/2023 108 (H) 37 - 98 U/L Final     AST   Date Value Ref Range Status   06/28/2023 20 15 - 37 U/L Final     ALT   Date Value Ref Range Status   06/28/2023 35 13 - 56 U/L Final     Anion Gap   Date Value Ref Range Status   06/28/2023 12 7 - 16 mmol/L Final     eGFR   Date Value Ref Range Status   06/28/2023 125 >=60 mL/min/1.73m2 Final      Lab Results   Component Value Date    HGBA1C 5.4 04/11/2022      Lab Results  "  Component Value Date    CHOL 174 06/28/2023    CHOL 159 02/06/2023    CHOL 161 04/11/2022     Lab Results   Component Value Date    HDL 40 06/28/2023    HDL 40 02/06/2023    HDL 34 (L) 04/11/2022     Lab Results   Component Value Date    LDLCALC 105 06/28/2023    LDLCALC 93 02/06/2023    LDLCALC 108 04/11/2022     No results found for: "DLDL"  Lab Results   Component Value Date    TRIG 146 06/28/2023    TRIG 131 02/06/2023    TRIG 94 04/11/2022     Lab Results   Component Value Date    CHOLHDL 4.4 06/28/2023    CHOLHDL 4.0 02/06/2023    CHOLHDL 4.7 04/11/2022      Lab Results   Component Value Date    TSH 3.500 06/28/2023    FREET4 0.86 06/28/2023        Assessment and Plan (including Health Maintenance)      Problem List  Smart Sets  Document Outside HM   :    Plan:     1. Sorethroat  -     SARS Coronavirus 2 Antigen, POCT    2. Rhinitis, unspecified type  -     SARS Coronavirus 2 Antigen, POCT    3. COVID-19  -     azithromycin (Z-WILLIAM) 250 MG tablet; Take 2 tablets by mouth on day 1; Take 1 tablet by mouth on days 2-5  Dispense: 6 tablet; Refill: 0  -     cetirizine (ZYRTEC) 10 MG tablet; Take 1 tablet (10 mg total) by mouth once daily.  Dispense: 30 tablet; Refill: 11  -     fluticasone propionate (FLONASE) 50 mcg/actuation nasal spray; 1 spray (50 mcg total) by Each Nostril route once daily.  Dispense: 16 g; Refill: 3         There are no Patient Instructions on file for this visit.     Health Maintenance Due   Topic Date Due    COVID-19 Vaccine (1) Never done    Influenza Vaccine (1) 09/01/2023         Health Maintenance Topics with due status: Not Due       Topic Last Completion Date    Pap Smear 10/11/2022    TETANUS VACCINE 05/10/2023       Future Appointments   Date Time Provider Department Center   9/1/2023 10:00 AM Johanny Yao ACNP [facility]            Signature:  ISRAEL Young  [facility] " [address] [address]  [phone]    Date of encounter: 9/1/23

## 2023-09-01 NOTE — LETTER
[address] [facility] [address]  Phone: [phone]  Fax: [phone]          Return to Work/School    Patient: Jing Hanson  YOB: 1995   Date: 09/01/2023     To Whom It May Concern:     Jing Hanson was in contact with/seen in my office on 09/01/2023. COVID-19 is present in our communities across the [facility].She tested positive on 09/01/2023, she may return to work on 09/06/2023.     If you have any questions or concerns, or if I can be of further assistance, please do not hesitate to contact me.     Sincerely,    BÁRBARA YoungP

## 2023-09-13 ENCOUNTER — OFFICE VISIT (OUTPATIENT)
Dept: FAMILY MEDICINE | Facility: CLINIC | Age: 28
End: 2023-09-13
Payer: MEDICAID

## 2023-09-13 ENCOUNTER — APPOINTMENT (OUTPATIENT)
Dept: RADIOLOGY | Facility: CLINIC | Age: 28
End: 2023-09-13
Attending: NURSE PRACTITIONER
Payer: MEDICAID

## 2023-09-13 VITALS
HEART RATE: 75 BPM | WEIGHT: 276.19 LBS | SYSTOLIC BLOOD PRESSURE: 121 MMHG | BODY MASS INDEX: 41.86 KG/M2 | HEIGHT: 68 IN | TEMPERATURE: 98 F | OXYGEN SATURATION: 98 % | DIASTOLIC BLOOD PRESSURE: 78 MMHG

## 2023-09-13 DIAGNOSIS — R30.0 DYSURIA: ICD-10-CM

## 2023-09-13 DIAGNOSIS — F51.01 PRIMARY INSOMNIA: ICD-10-CM

## 2023-09-13 DIAGNOSIS — G44.201 ACUTE INTRACTABLE TENSION-TYPE HEADACHE: ICD-10-CM

## 2023-09-13 DIAGNOSIS — M54.50 ACUTE MIDLINE LOW BACK PAIN WITHOUT SCIATICA: ICD-10-CM

## 2023-09-13 DIAGNOSIS — S39.012A STRAIN OF LUMBAR REGION, INITIAL ENCOUNTER: Primary | ICD-10-CM

## 2023-09-13 LAB
BILIRUB SERPL-MCNC: ABNORMAL MG/DL
BLOOD URINE, POC: ABNORMAL
COLOR, POC UA: YELLOW
GLUCOSE UR QL STRIP: ABNORMAL
KETONES UR QL STRIP: ABNORMAL
LEUKOCYTE ESTERASE URINE, POC: ABNORMAL
NITRITE, POC UA: ABNORMAL
PH, POC UA: 6
PROTEIN, POC: ABNORMAL
SPECIFIC GRAVITY, POC UA: 1.03
UROBILINOGEN, POC UA: 0.2

## 2023-09-13 PROCEDURE — 72100 X-RAY EXAM L-S SPINE 2/3 VWS: CPT | Mod: 26,,, | Performed by: RADIOLOGY

## 2023-09-13 PROCEDURE — 96372 THER/PROPH/DIAG INJ SC/IM: CPT | Mod: ,,, | Performed by: NURSE PRACTITIONER

## 2023-09-13 PROCEDURE — 81003 POCT URINALYSIS W/O SCOPE: ICD-10-PCS | Mod: QW,,, | Performed by: NURSE PRACTITIONER

## 2023-09-13 PROCEDURE — 87086 CULTURE, URINE: ICD-10-PCS | Mod: ,,, | Performed by: CLINICAL MEDICAL LABORATORY

## 2023-09-13 PROCEDURE — 3078F PR MOST RECENT DIASTOLIC BLOOD PRESSURE < 80 MM HG: ICD-10-PCS | Mod: ,,, | Performed by: NURSE PRACTITIONER

## 2023-09-13 PROCEDURE — 72100 X-RAY EXAM L-S SPINE 2/3 VWS: CPT | Mod: TC,RHCUB,FY | Performed by: NURSE PRACTITIONER

## 2023-09-13 PROCEDURE — 3074F SYST BP LT 130 MM HG: CPT | Mod: ,,, | Performed by: NURSE PRACTITIONER

## 2023-09-13 PROCEDURE — 3008F PR BODY MASS INDEX (BMI) DOCUMENTED: ICD-10-PCS | Mod: ,,, | Performed by: NURSE PRACTITIONER

## 2023-09-13 PROCEDURE — 99213 PR OFFICE/OUTPT VISIT, EST, LEVL III, 20-29 MIN: ICD-10-PCS | Mod: 25,,, | Performed by: NURSE PRACTITIONER

## 2023-09-13 PROCEDURE — 87086 URINE CULTURE/COLONY COUNT: CPT | Mod: ,,, | Performed by: CLINICAL MEDICAL LABORATORY

## 2023-09-13 PROCEDURE — 3074F PR MOST RECENT SYSTOLIC BLOOD PRESSURE < 130 MM HG: ICD-10-PCS | Mod: ,,, | Performed by: NURSE PRACTITIONER

## 2023-09-13 PROCEDURE — 3078F DIAST BP <80 MM HG: CPT | Mod: ,,, | Performed by: NURSE PRACTITIONER

## 2023-09-13 PROCEDURE — 99213 OFFICE O/P EST LOW 20 MIN: CPT | Mod: 25,,, | Performed by: NURSE PRACTITIONER

## 2023-09-13 PROCEDURE — 96372 PR INJECTION,THERAP/PROPH/DIAG2ST, IM OR SUBCUT: ICD-10-PCS | Mod: ,,, | Performed by: NURSE PRACTITIONER

## 2023-09-13 PROCEDURE — 3008F BODY MASS INDEX DOCD: CPT | Mod: ,,, | Performed by: NURSE PRACTITIONER

## 2023-09-13 PROCEDURE — 72100 XR LUMBAR SPINE AP AND LATERAL: ICD-10-PCS | Mod: 26,,, | Performed by: RADIOLOGY

## 2023-09-13 PROCEDURE — 81003 URINALYSIS AUTO W/O SCOPE: CPT | Mod: QW,,, | Performed by: NURSE PRACTITIONER

## 2023-09-13 RX ORDER — BETAMETHASONE SODIUM PHOSPHATE AND BETAMETHASONE ACETATE 3; 3 MG/ML; MG/ML
6 INJECTION, SUSPENSION INTRA-ARTICULAR; INTRALESIONAL; INTRAMUSCULAR; SOFT TISSUE
Status: COMPLETED | OUTPATIENT
Start: 2023-09-13 | End: 2023-09-13

## 2023-09-13 RX ORDER — KETOROLAC TROMETHAMINE 30 MG/ML
60 INJECTION, SOLUTION INTRAMUSCULAR; INTRAVENOUS
Status: COMPLETED | OUTPATIENT
Start: 2023-09-13 | End: 2023-09-13

## 2023-09-13 RX ORDER — HYDROXYZINE HYDROCHLORIDE 25 MG/1
25 TABLET, FILM COATED ORAL NIGHTLY PRN
Qty: 30 TABLET | Refills: 0 | Status: SHIPPED | OUTPATIENT
Start: 2023-09-13

## 2023-09-13 RX ADMIN — KETOROLAC TROMETHAMINE 60 MG: 30 INJECTION, SOLUTION INTRAMUSCULAR; INTRAVENOUS at 09:09

## 2023-09-13 RX ADMIN — BETAMETHASONE SODIUM PHOSPHATE AND BETAMETHASONE ACETATE 6 MG: 3; 3 INJECTION, SUSPENSION INTRA-ARTICULAR; INTRALESIONAL; INTRAMUSCULAR; SOFT TISSUE at 09:09

## 2023-09-13 NOTE — PROGRESS NOTES
Johanny Freeman DNP   [address] [place] [address]     PATIENT NAME: Jing Hanson  : 1995  DATE: 23  MRN: [id number]      Billing Provider: Johanny Freeman DNP  Level of Service:   Patient PCP Information       Provider PCP Type    Johanny Freeman DNP General            Reason for Visit / Chief Complaint: Migraine and back pain      Update PCP  Update Chief Complaint         History of Present Illness / Problem Focused Workflow     Jing Hanson presents to the clinic with Migraine     Migraine         Review of Systems     Review of Systems     Medical / Social / Family History     Past Medical History:   Diagnosis Date    Abnormal Pap smear of cervix        Past Surgical History:   Procedure Laterality Date    APPENDECTOMY      LAPAROSCOPIC CHOLECYSTECTOMY N/A 2023    Procedure: CHOLECYSTECTOMY, LAPAROSCOPIC;  Surgeon: Nikolay Johnson MD;  Location: [facility];  Service: General;  Laterality: N/A;    REPAIR, HERNIA, UMBILICAL N/A 2023    Procedure: REPAIR, HERNIA, UMBILICAL;  Surgeon: Nikolay Johnson MD;  Location: [facility];  Service: General;  Laterality: N/A;    TUBAL LIGATION         Social History  Ms.  reports that she has never smoked. She has never been exposed to tobacco smoke. She has never used smokeless tobacco. She reports current alcohol use. She reports that she does not use drugs.    Family History  Ms.'s family history includes Diabetes in her father and mother; Epilepsy in her mother; Hypertension in her father and mother.    Medications and Allergies     Medications  No outpatient medications have been marked as taking for the 23 encounter (Office Visit) with Johanny Freeman DNP.     Current Facility-Administered Medications for the 23 encounter (Office Visit) with Johanny Freeman DNP   Medication Dose Route Frequency Provider Last Rate Last Admin    [COMPLETED] betamethasone acetate-betamethasone sodium phosphate injection 6 mg   "6 mg Intramuscular 1 time in Clinic/HOD Pete Johannyzen FOWLER DNP   6 mg at 09/13/23 0920    [COMPLETED] ketorolac injection 60 mg  60 mg Intramuscular 1 time in Clinic/HOD Johanny Freeman CANDY   60 mg at 09/13/23 0920       Allergies  Review of patient's allergies indicates:  No Known Allergies    Physical Examination   /78   Pulse 75   Temp 97.9 °F (36.6 °C)   Ht 5' 8" (1.727 m)   Wt 125.3 kg (276 lb 3.2 oz)   LMP 09/12/2023   SpO2 98%   BMI 42.00 kg/m²    Physical Exam  Vitals and nursing note reviewed.   Constitutional:       Appearance: Normal appearance. She is obese.   HENT:      Head: Normocephalic.      Nose: Nose normal.      Mouth/Throat:      Mouth: Mucous membranes are moist.   Eyes:      Pupils: Pupils are equal, round, and reactive to light.   Cardiovascular:      Rate and Rhythm: Normal rate and regular rhythm.      Pulses: Normal pulses.      Heart sounds: Normal heart sounds.   Pulmonary:      Effort: Pulmonary effort is normal.      Breath sounds: Normal breath sounds.   Abdominal:      General: Abdomen is flat. Bowel sounds are normal.      Palpations: Abdomen is soft.   Musculoskeletal:         General: Tenderness present. Normal range of motion.      Cervical back: Normal range of motion and neck supple.      Comments: Low back midline radiating across     Skin:     General: Skin is warm and dry.      Capillary Refill: Capillary refill takes less than 2 seconds.   Neurological:      General: No focal deficit present.      Mental Status: She is alert and oriented to person, place, and time. Mental status is at baseline.   Psychiatric:         Mood and Affect: Mood normal.         Behavior: Behavior normal.         Thought Content: Thought content normal.         Judgment: Judgment normal.          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          Health Maintenance Due   Topic Date Due    COVID-19 Vaccine (1) Never done    Influenza Vaccine (1) 09/01/2023       Problem List Items Addressed This Visit          Neuro    Acute intractable tension-type headache       Renal/    Dysuria       Endocrine    BMI 35.0-35.9,adult       Orthopedic    Acute midline low back pain without sciatica    Relevant Orders    POCT URINALYSIS W/O SCOPE (Completed)    Urine culture    X-Ray Lumbar Spine AP And Lateral (Completed)    Strain of lumbar region - Primary       Other    Primary insomnia       Health Maintenance Topics with due status: Not Due       Topic Last Completion Date    Pap Smear 10/11/2022    TETANUS VACCINE 05/10/2023       No future appointments.         Signature:  Johanny Freeman, CANDY      [address] [place] [address]    Date of encounter: 9/13/23

## 2023-09-15 LAB — UA COMPLETE W REFLEX CULTURE PNL UR: NORMAL

## 2023-10-02 PROBLEM — Z13.6 ENCOUNTER FOR SCREENING FOR CARDIOVASCULAR DISORDERS: Status: RESOLVED | Noted: 2022-04-11 | Resolved: 2023-10-02

## 2023-11-29 ENCOUNTER — OFFICE VISIT (OUTPATIENT)
Dept: FAMILY MEDICINE | Facility: CLINIC | Age: 28
End: 2023-11-29
Payer: MEDICAID

## 2023-11-29 ENCOUNTER — PATIENT MESSAGE (OUTPATIENT)
Dept: FAMILY MEDICINE | Facility: CLINIC | Age: 28
End: 2023-11-29
Payer: MEDICAID

## 2023-11-29 VITALS
WEIGHT: 278.81 LBS | BODY MASS INDEX: 42.26 KG/M2 | DIASTOLIC BLOOD PRESSURE: 82 MMHG | HEIGHT: 68 IN | SYSTOLIC BLOOD PRESSURE: 126 MMHG | OXYGEN SATURATION: 96 % | TEMPERATURE: 98 F | HEART RATE: 81 BPM

## 2023-11-29 DIAGNOSIS — N91.2 AMENORRHEA: ICD-10-CM

## 2023-11-29 DIAGNOSIS — R73.9 HYPERGLYCEMIA: ICD-10-CM

## 2023-11-29 DIAGNOSIS — N92.6 IRREGULAR MENSTRUAL CYCLE: Primary | ICD-10-CM

## 2023-11-29 DIAGNOSIS — Z11.59 SCREENING FOR VIRAL DISEASE: ICD-10-CM

## 2023-11-29 LAB
ALBUMIN SERPL BCP-MCNC: 3.3 G/DL (ref 3.5–5)
ALBUMIN/GLOB SERPL: 0.8 {RATIO}
ALP SERPL-CCNC: 100 U/L (ref 37–98)
ALT SERPL W P-5'-P-CCNC: 34 U/L (ref 13–56)
ANION GAP SERPL CALCULATED.3IONS-SCNC: 10 MMOL/L (ref 7–16)
AST SERPL W P-5'-P-CCNC: 13 U/L (ref 15–37)
B-HCG UR QL: NEGATIVE
BACTERIA VAG QL WET PREP: ABNORMAL /HPF
BASOPHILS # BLD AUTO: 0.05 K/UL (ref 0–0.2)
BASOPHILS NFR BLD AUTO: 0.4 % (ref 0–1)
BILIRUB SERPL-MCNC: 0.1 MG/DL (ref ?–1.2)
BUN SERPL-MCNC: 19 MG/DL (ref 7–18)
BUN/CREAT SERPL: 25 (ref 6–20)
CALCIUM SERPL-MCNC: 9.3 MG/DL (ref 8.5–10.1)
CHLORIDE SERPL-SCNC: 105 MMOL/L (ref 98–107)
CLUE CELLS VAG QL WET PREP: ABNORMAL /HPF
CO2 SERPL-SCNC: 27 MMOL/L (ref 21–32)
CREAT SERPL-MCNC: 0.75 MG/DL (ref 0.55–1.02)
CTP QC/QA: YES
DIFFERENTIAL METHOD BLD: ABNORMAL
EGFR (NO RACE VARIABLE) (RUSH/TITUS): 113 ML/MIN/1.73M2
EOSINOPHIL # BLD AUTO: 0.22 K/UL (ref 0–0.5)
EOSINOPHIL NFR BLD AUTO: 1.8 % (ref 1–4)
ERYTHROCYTE [DISTWIDTH] IN BLOOD BY AUTOMATED COUNT: 14.6 % (ref 11.5–14.5)
EST. AVERAGE GLUCOSE BLD GHB EST-MCNC: 114 MG/DL
GLOBULIN SER-MCNC: 4.3 G/DL (ref 2–4)
GLUCOSE SERPL-MCNC: 80 MG/DL (ref 74–106)
HBA1C MFR BLD HPLC: 5.6 % (ref 4.5–6.6)
HCG SERPL-ACNC: <1 MIU/ML
HCT VFR BLD AUTO: 37.9 % (ref 38–47)
HGB BLD-MCNC: 12.3 G/DL (ref 12–16)
IMM GRANULOCYTES # BLD AUTO: 0.03 K/UL (ref 0–0.04)
IMM GRANULOCYTES NFR BLD: 0.2 % (ref 0–0.4)
LYMPHOCYTES # BLD AUTO: 2.83 K/UL (ref 1–4.8)
LYMPHOCYTES NFR BLD AUTO: 23.5 % (ref 27–41)
MCH RBC QN AUTO: 27.2 PG (ref 27–31)
MCHC RBC AUTO-ENTMCNC: 32.5 G/DL (ref 32–36)
MCV RBC AUTO: 83.7 FL (ref 80–96)
MONOCYTES # BLD AUTO: 0.72 K/UL (ref 0–0.8)
MONOCYTES NFR BLD AUTO: 6 % (ref 2–6)
MPC BLD CALC-MCNC: 11.8 FL (ref 9.4–12.4)
NEUTROPHILS # BLD AUTO: 8.19 K/UL (ref 1.8–7.7)
NEUTROPHILS NFR BLD AUTO: 68.1 % (ref 53–65)
NRBC # BLD AUTO: 0 X10E3/UL
NRBC, AUTO (.00): 0 %
PLATELET # BLD AUTO: 333 K/UL (ref 150–400)
POTASSIUM SERPL-SCNC: 3.7 MMOL/L (ref 3.5–5.1)
PROT SERPL-MCNC: 7.6 G/DL (ref 6.4–8.2)
RBC # BLD AUTO: 4.53 M/UL (ref 4.2–5.4)
RBC #/AREA VAG WET PREP: ABNORMAL /HPF
SODIUM SERPL-SCNC: 138 MMOL/L (ref 136–145)
SQUAMOUS EPITHELIALS WET WOUNT, GENITAL: ABNORMAL /HPF
T VAGINALIS VAG QL WET PREP: ABNORMAL /HPF
WBC # BLD AUTO: 12.04 K/UL (ref 4.5–11)
WBC CLUMPS WET MOUNT, GENITAL: ABNORMAL /HPF
WBC VAG QL WET PREP: ABNORMAL /HPF
YEAST VAG QL WET PREP: ABNORMAL /HPF

## 2023-11-29 PROCEDURE — 87661 TRICHOMONAS VAGINALIS AMPLIF: CPT | Mod: ,,, | Performed by: CLINICAL MEDICAL LABORATORY

## 2023-11-29 PROCEDURE — 87591 N.GONORRHOEAE DNA AMP PROB: CPT | Mod: ,,, | Performed by: CLINICAL MEDICAL LABORATORY

## 2023-11-29 PROCEDURE — 3008F BODY MASS INDEX DOCD: CPT | Mod: ,,, | Performed by: NURSE PRACTITIONER

## 2023-11-29 PROCEDURE — 99213 OFFICE O/P EST LOW 20 MIN: CPT | Mod: ,,, | Performed by: NURSE PRACTITIONER

## 2023-11-29 PROCEDURE — 3074F PR MOST RECENT SYSTOLIC BLOOD PRESSURE < 130 MM HG: ICD-10-PCS | Mod: ,,, | Performed by: NURSE PRACTITIONER

## 2023-11-29 PROCEDURE — 84702 HCG, TOTAL, QUANTITATIVE: ICD-10-PCS | Mod: ,,, | Performed by: CLINICAL MEDICAL LABORATORY

## 2023-11-29 PROCEDURE — 87661 TRICHOMONAS VAGINALIS BY PCR: ICD-10-PCS | Mod: ,,, | Performed by: CLINICAL MEDICAL LABORATORY

## 2023-11-29 PROCEDURE — 80053 COMPREHEN METABOLIC PANEL: CPT | Mod: ,,, | Performed by: CLINICAL MEDICAL LABORATORY

## 2023-11-29 PROCEDURE — 87210 WET PREP, GENITAL: ICD-10-PCS | Mod: 59,,, | Performed by: CLINICAL MEDICAL LABORATORY

## 2023-11-29 PROCEDURE — 99213 PR OFFICE/OUTPT VISIT, EST, LEVL III, 20-29 MIN: ICD-10-PCS | Mod: ,,, | Performed by: NURSE PRACTITIONER

## 2023-11-29 PROCEDURE — 83036 HEMOGLOBIN GLYCOSYLATED A1C: CPT | Mod: ,,, | Performed by: CLINICAL MEDICAL LABORATORY

## 2023-11-29 PROCEDURE — 3008F PR BODY MASS INDEX (BMI) DOCUMENTED: ICD-10-PCS | Mod: ,,, | Performed by: NURSE PRACTITIONER

## 2023-11-29 PROCEDURE — 83036 HEMOGLOBIN A1C: ICD-10-PCS | Mod: ,,, | Performed by: CLINICAL MEDICAL LABORATORY

## 2023-11-29 PROCEDURE — 81025 URINE PREGNANCY TEST: CPT | Mod: QW,,, | Performed by: NURSE PRACTITIONER

## 2023-11-29 PROCEDURE — 87491 CHLAMYDIA/GONORRHOEAE(GC), PCR: ICD-10-PCS | Mod: ,,, | Performed by: CLINICAL MEDICAL LABORATORY

## 2023-11-29 PROCEDURE — 84702 CHORIONIC GONADOTROPIN TEST: CPT | Mod: ,,, | Performed by: CLINICAL MEDICAL LABORATORY

## 2023-11-29 PROCEDURE — 3074F SYST BP LT 130 MM HG: CPT | Mod: ,,, | Performed by: NURSE PRACTITIONER

## 2023-11-29 PROCEDURE — 81025 POCT URINE PREGNANCY: ICD-10-PCS | Mod: QW,,, | Performed by: NURSE PRACTITIONER

## 2023-11-29 PROCEDURE — 85025 COMPLETE CBC W/AUTO DIFF WBC: CPT | Mod: ,,, | Performed by: CLINICAL MEDICAL LABORATORY

## 2023-11-29 PROCEDURE — 80053 COMPREHENSIVE METABOLIC PANEL: ICD-10-PCS | Mod: ,,, | Performed by: CLINICAL MEDICAL LABORATORY

## 2023-11-29 PROCEDURE — 87591 CHLAMYDIA/GONORRHOEAE(GC), PCR: ICD-10-PCS | Mod: ,,, | Performed by: CLINICAL MEDICAL LABORATORY

## 2023-11-29 PROCEDURE — 85025 CBC WITH DIFFERENTIAL: ICD-10-PCS | Mod: ,,, | Performed by: CLINICAL MEDICAL LABORATORY

## 2023-11-29 PROCEDURE — 87210 SMEAR WET MOUNT SALINE/INK: CPT | Mod: 59,,, | Performed by: CLINICAL MEDICAL LABORATORY

## 2023-11-29 PROCEDURE — 87491 CHLMYD TRACH DNA AMP PROBE: CPT | Mod: ,,, | Performed by: CLINICAL MEDICAL LABORATORY

## 2023-11-29 NOTE — PROGRESS NOTES
"   Johanny Freeman DNP   [address] [place] [address]     PATIENT NAME: Jing Hanson  : 1995  DATE: 23  MRN: [id number]      Billing Provider: Johanny Freeman DNP  Level of Service:   Patient PCP Information       Provider PCP Type    Johanny Freeman DNP General            Reason for Visit / Chief Complaint: Amenorrhea       Update PCP  Update Chief Complaint         History of Present Illness / Problem Focused Workflow     Jing Hanson presents to the clinic with Amenorrhea     HPI    Review of Systems     Review of Systems     Medical / Social / Family History     Past Medical History:   Diagnosis Date    Abnormal Pap smear of cervix        Past Surgical History:   Procedure Laterality Date    APPENDECTOMY      LAPAROSCOPIC CHOLECYSTECTOMY N/A 2023    Procedure: CHOLECYSTECTOMY, LAPAROSCOPIC;  Surgeon: Nikolay Johnson MD;  Location: [facility];  Service: General;  Laterality: N/A;    REPAIR, HERNIA, UMBILICAL N/A 2023    Procedure: REPAIR, HERNIA, UMBILICAL;  Surgeon: Nikolay Johnson MD;  Location: [facility];  Service: General;  Laterality: N/A;    TUBAL LIGATION         Social History  Ms.  reports that she has never smoked. She has never been exposed to tobacco smoke. She has never used smokeless tobacco. She reports current alcohol use. She reports that she does not use drugs.    Family History  Ms.'s family history includes Diabetes in her father and mother; Epilepsy in her mother; Hypertension in her father and mother.    Medications and Allergies     Medications  No outpatient medications have been marked as taking for the 23 encounter (Office Visit) with Johanny Freeman DNP.       Allergies  Review of patient's allergies indicates:  No Known Allergies    Physical Examination   /82   Pulse 81   Temp 98.1 °F (36.7 °C)   Ht 5' 8" (1.727 m)   Wt 126.5 kg (278 lb 12.8 oz)   LMP 10/19/2023   SpO2 96%   BMI 42.39 kg/m²    Physical Exam  Vitals " and nursing note reviewed.   Constitutional:       Appearance: Normal appearance. She is obese.   HENT:      Head: Normocephalic.      Nose: Nose normal.      Mouth/Throat:      Mouth: Mucous membranes are moist.   Eyes:      Extraocular Movements: Extraocular movements intact.      Conjunctiva/sclera: Conjunctivae normal.      Pupils: Pupils are equal, round, and reactive to light.   Cardiovascular:      Rate and Rhythm: Normal rate and regular rhythm.      Pulses: Normal pulses.      Heart sounds: Normal heart sounds.   Pulmonary:      Effort: Pulmonary effort is normal.      Breath sounds: Normal breath sounds.   Abdominal:      General: Abdomen is flat. Bowel sounds are normal.      Palpations: Abdomen is soft.   Musculoskeletal:         General: Normal range of motion.      Cervical back: Normal range of motion and neck supple.   Skin:     General: Skin is warm and dry.      Capillary Refill: Capillary refill takes less than 2 seconds.   Neurological:      General: No focal deficit present.      Mental Status: She is alert and oriented to person, place, and time. Mental status is at baseline.   Psychiatric:         Mood and Affect: Mood normal.         Behavior: Behavior normal.         Thought Content: Thought content normal.         Judgment: Judgment normal.          Assessment and Plan (including Health Maintenance)      Problem List  Smart Sets  Document Outside HM   :    Plan:     Concerned for irregular cycle   Last cycle October 10 spotting then started full cycle on oct 19  Labs today  Recheck stds  Denies pain or dc currently.    Hx tubal ligation >2 years ago with 3rd child    Health Maintenance Due   Topic Date Due    COVID-19 Vaccine (1) Never done    Influenza Vaccine (1) 09/01/2023       Problem List Items Addressed This Visit          Renal/    Amenorrhea    Relevant Orders    POCT urine pregnancy (Completed)    hCG, Total, Quantitative    Irregular menstrual cycle - Primary       ID     Screening for viral disease    Relevant Orders    Wet Prep, Genital    Chlamydia/GC, PCR    Trichomonas vaginalis by PCR       Endocrine    Hyperglycemia    Relevant Orders    Hemoglobin A1C    CBC Auto Differential    Comprehensive Metabolic Panel    BMI 40.0-44.9, adult       Health Maintenance Topics with due status: Not Due       Topic Last Completion Date    Pap Smear 10/11/2022    TETANUS VACCINE 05/10/2023       No future appointments.         Signature:  Johanny Freeman, CANDY      [address] [place] [address]    Date of encounter: 11/29/23

## 2023-11-30 RX ORDER — METRONIDAZOLE 500 MG/1
500 TABLET ORAL EVERY 12 HOURS
Qty: 14 TABLET | Refills: 0 | Status: SHIPPED | OUTPATIENT
Start: 2023-11-30

## 2023-12-01 LAB
CHLAMYDIA BY PCR: NEGATIVE
N. GONORRHOEAE (GC) BY PCR: NEGATIVE

## 2023-12-02 LAB — TRICHOMONAS NAT: POSITIVE

## 2024-03-06 ENCOUNTER — OFFICE VISIT (OUTPATIENT)
Dept: FAMILY MEDICINE | Facility: CLINIC | Age: 29
End: 2024-03-06
Payer: MEDICAID

## 2024-03-06 VITALS
OXYGEN SATURATION: 99 % | WEIGHT: 285 LBS | HEIGHT: 68 IN | TEMPERATURE: 98 F | RESPIRATION RATE: 18 BRPM | DIASTOLIC BLOOD PRESSURE: 85 MMHG | BODY MASS INDEX: 43.19 KG/M2 | SYSTOLIC BLOOD PRESSURE: 125 MMHG | HEART RATE: 79 BPM

## 2024-03-06 DIAGNOSIS — R30.0 DYSURIA: ICD-10-CM

## 2024-03-06 DIAGNOSIS — Z11.3 SCREEN FOR STD (SEXUALLY TRANSMITTED DISEASE): ICD-10-CM

## 2024-03-06 DIAGNOSIS — S39.012A LUMBAR STRAIN, INITIAL ENCOUNTER: Primary | ICD-10-CM

## 2024-03-06 DIAGNOSIS — M54.50 ACUTE MIDLINE LOW BACK PAIN WITHOUT SCIATICA: ICD-10-CM

## 2024-03-06 LAB
BACTERIA VAG QL WET PREP: ABNORMAL /HPF
BILIRUB SERPL-MCNC: NORMAL MG/DL
BLOOD URINE, POC: NORMAL
CLUE CELLS VAG QL WET PREP: ABNORMAL /HPF
COLOR, POC UA: YELLOW
GLUCOSE UR QL STRIP: NORMAL
KETONES UR QL STRIP: NORMAL
LEUKOCYTE ESTERASE URINE, POC: NORMAL
NITRITE, POC UA: NORMAL
PH, POC UA: 6
PROTEIN, POC: NORMAL
RBC #/AREA VAG WET PREP: ABNORMAL /HPF
SPECIFIC GRAVITY, POC UA: 1.03
SQUAMOUS EPITHELIALS WET WOUNT, GENITAL: ABNORMAL /HPF
T VAGINALIS VAG QL WET PREP: ABNORMAL /HPF
UROBILINOGEN, POC UA: 0.2
WBC CLUMPS WET MOUNT, GENITAL: ABNORMAL /HPF
WBC VAG QL WET PREP: ABNORMAL /HPF
YEAST VAG QL WET PREP: ABNORMAL /HPF

## 2024-03-06 PROCEDURE — 81003 URINALYSIS AUTO W/O SCOPE: CPT | Mod: QW,,, | Performed by: NURSE PRACTITIONER

## 2024-03-06 PROCEDURE — 99213 OFFICE O/P EST LOW 20 MIN: CPT | Mod: ,,, | Performed by: NURSE PRACTITIONER

## 2024-03-06 PROCEDURE — 87801 DETECT AGNT MULT DNA AMPLI: CPT | Mod: ,,, | Performed by: CLINICAL MEDICAL LABORATORY

## 2024-03-06 PROCEDURE — 3074F SYST BP LT 130 MM HG: CPT | Mod: ,,, | Performed by: NURSE PRACTITIONER

## 2024-03-06 PROCEDURE — 87661 TRICHOMONAS VAGINALIS AMPLIF: CPT | Mod: 59,,, | Performed by: CLINICAL MEDICAL LABORATORY

## 2024-03-06 PROCEDURE — 87086 URINE CULTURE/COLONY COUNT: CPT | Mod: ,,, | Performed by: CLINICAL MEDICAL LABORATORY

## 2024-03-06 PROCEDURE — 87210 SMEAR WET MOUNT SALINE/INK: CPT | Mod: 59,,, | Performed by: CLINICAL MEDICAL LABORATORY

## 2024-03-06 PROCEDURE — 3008F BODY MASS INDEX DOCD: CPT | Mod: ,,, | Performed by: NURSE PRACTITIONER

## 2024-03-06 RX ORDER — METHYLPREDNISOLONE 4 MG/1
TABLET ORAL
Qty: 21 TABLET | Refills: 0 | Status: SHIPPED | OUTPATIENT
Start: 2024-03-06 | End: 2024-06-11

## 2024-03-06 RX ORDER — IBUPROFEN 800 MG/1
800 TABLET ORAL EVERY 6 HOURS PRN
Qty: 30 TABLET | Refills: 0 | Status: SHIPPED | OUTPATIENT
Start: 2024-03-06 | End: 2024-06-11

## 2024-03-06 RX ORDER — TIZANIDINE 4 MG/1
4 TABLET ORAL EVERY 8 HOURS
Qty: 20 TABLET | Refills: 0 | Status: SHIPPED | OUTPATIENT
Start: 2024-03-06 | End: 2024-06-11

## 2024-03-06 NOTE — PROGRESS NOTES
Johanny Freeman DNP   [address] [place] [address]     PATIENT NAME: Jing Hanson  : 1995  DATE: 3/6/24  MRN: [id number]      Billing Provider: Johanny Freeman DNP  Level of Service:   Patient PCP Information       Provider PCP Type    Johanny Freeman DNP General            Reason for Visit / Chief Complaint: Chest Pain and Back Pain (Lower back pain)       Update PCP  Update Chief Complaint         History of Present Illness / Problem Focused Workflow     Jing Hanson presents to the clinic with Chest Pain and Back Pain (Lower back pain)     Chest Pain   Associated symptoms include back pain.   Back Pain  Associated symptoms include chest pain.     Review of Systems     Review of Systems   Cardiovascular:  Positive for chest pain.   Musculoskeletal:  Positive for back pain.      Medical / Social / Family History     Past Medical History:   Diagnosis Date    Abnormal Pap smear of cervix        Past Surgical History:   Procedure Laterality Date    APPENDECTOMY      LAPAROSCOPIC CHOLECYSTECTOMY N/A 2023    Procedure: CHOLECYSTECTOMY, LAPAROSCOPIC;  Surgeon: Nikolay Johnson MD;  Location: [facility];  Service: General;  Laterality: N/A;    REPAIR, HERNIA, UMBILICAL N/A 2023    Procedure: REPAIR, HERNIA, UMBILICAL;  Surgeon: Nikolay Johnson MD;  Location: [facility];  Service: General;  Laterality: N/A;    TUBAL LIGATION         Social History  Ms.  reports that she has never smoked. She has never been exposed to tobacco smoke. She has never used smokeless tobacco. She reports current alcohol use. She reports that she does not use drugs.    Family History  Ms.'s family history includes Diabetes in her father and mother; Epilepsy in her mother; Hypertension in her father and mother.    Medications and Allergies     Medications  No outpatient medications have been marked as taking for the 3/6/24 encounter (Office Visit) with Johanny Freeman DNP.       Allergies  Review  "of patient's allergies indicates:  No Known Allergies    Physical Examination   /85 (BP Location: Left arm, Patient Position: Sitting, BP Method: Large (Automatic))   Pulse 79   Temp 98.1 °F (36.7 °C) (Oral)   Resp 18   Ht 5' 8" (1.727 m)   Wt 129.3 kg (285 lb)   SpO2 99%   BMI 43.33 kg/m²    Physical Exam  Vitals and nursing note reviewed.   Constitutional:       Appearance: She is obese.   HENT:      Head: Normocephalic.      Nose: Nose normal.      Mouth/Throat:      Mouth: Mucous membranes are moist.   Eyes:      Extraocular Movements: Extraocular movements intact.      Conjunctiva/sclera: Conjunctivae normal.      Pupils: Pupils are equal, round, and reactive to light.   Cardiovascular:      Rate and Rhythm: Normal rate and regular rhythm.      Pulses: Normal pulses.      Heart sounds: Normal heart sounds.   Pulmonary:      Effort: Pulmonary effort is normal.      Breath sounds: Normal breath sounds.   Abdominal:      Tenderness: There is abdominal tenderness.   Musculoskeletal:         General: Tenderness present.      Cervical back: Normal range of motion.   Skin:     General: Skin is warm and dry.      Capillary Refill: Capillary refill takes less than 2 seconds.   Neurological:      General: No focal deficit present.      Mental Status: She is alert and oriented to person, place, and time. Mental status is at baseline.   Psychiatric:         Mood and Affect: Mood normal.         Behavior: Behavior normal.         Thought Content: Thought content normal.         Judgment: Judgment normal.        Assessment and Plan (including Health Maintenance)      Problem List  Smart Sets  Document Outside HM   :    Plan:         There are no preventive care reminders to display for this patient.    Problem List Items Addressed This Visit          Renal/    Dysuria    Relevant Orders    POCT URINALYSIS W/O SCOPE (Completed)    Urine culture       ID    Screen for STD (sexually transmitted disease)    " Relevant Orders    Wet Prep, Genital    POCT URINALYSIS W/O SCOPE (Completed)    Chlamydia/GC, PCR    Trichomonas vaginalis by PCR    Urine culture       Endocrine    BMI 35.0-35.9,adult       Orthopedic    Acute midline low back pain without sciatica    Relevant Orders    POCT URINALYSIS W/O SCOPE (Completed)    Urine culture       Other    Lumbar strain, initial encounter - Primary       Health Maintenance Topics with due status: Not Due       Topic Last Completion Date    Pap Smear 10/11/2022    TETANUS VACCINE 05/10/2023       No future appointments.         Signature:  Johanny Freeman, CANDY      [address] [place] [address]    Date of encounter: 3/6/24

## 2024-03-07 LAB
CHLAMYDIA BY PCR: NEGATIVE
N. GONORRHOEAE (GC) BY PCR: NEGATIVE
TRICHOMONAS NAT: NEGATIVE

## 2024-03-08 LAB — UA COMPLETE W REFLEX CULTURE PNL UR: NORMAL

## 2024-06-11 ENCOUNTER — OFFICE VISIT (OUTPATIENT)
Dept: FAMILY MEDICINE | Facility: CLINIC | Age: 29
End: 2024-06-11
Payer: MEDICAID

## 2024-06-11 VITALS
TEMPERATURE: 98 F | HEIGHT: 68 IN | HEART RATE: 77 BPM | OXYGEN SATURATION: 98 % | WEIGHT: 278.81 LBS | DIASTOLIC BLOOD PRESSURE: 85 MMHG | RESPIRATION RATE: 18 BRPM | SYSTOLIC BLOOD PRESSURE: 128 MMHG | BODY MASS INDEX: 42.26 KG/M2

## 2024-06-11 DIAGNOSIS — M25.531 PAIN IN BOTH WRISTS: Primary | ICD-10-CM

## 2024-06-11 DIAGNOSIS — M25.532 PAIN IN BOTH WRISTS: Primary | ICD-10-CM

## 2024-06-11 PROCEDURE — 3008F BODY MASS INDEX DOCD: CPT | Mod: ,,,

## 2024-06-11 PROCEDURE — 3074F SYST BP LT 130 MM HG: CPT | Mod: ,,,

## 2024-06-11 PROCEDURE — 99212 OFFICE O/P EST SF 10 MIN: CPT | Mod: ,,,

## 2024-06-11 RX ORDER — IBUPROFEN 800 MG/1
800 TABLET ORAL EVERY 6 HOURS PRN
Qty: 60 TABLET | Refills: 1 | Status: SHIPPED | OUTPATIENT
Start: 2024-06-11

## 2024-06-11 NOTE — PATIENT INSTRUCTIONS
Try Ibuprofen as needed  Try wrist splint  Return to clinic if symptoms worsen and as needed.

## 2024-06-11 NOTE — PROGRESS NOTES
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 Pain has been ongoing for a few weeks. 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 She not tried medications for pain relief. Advised to try Ibuprofen as needed for pain and wrist splints. Return to clinic if symptoms worsen and as needed.     Wrist Pain       Review of Systems     Review of Systems   Constitutional: Negative.    Cardiovascular: Negative.    Gastrointestinal: Negative.    Genitourinary: Negative.    Musculoskeletal: Negative.         Wrist pain   Neurological: Negative.    Hematological: Negative.    Psychiatric/Behavioral: Negative.          Medical / Social / Family History     Past Medical History:   Diagnosis Date    Abnormal Pap smear of cervix        Past Surgical History:   Procedure Laterality Date    APPENDECTOMY      LAPAROSCOPIC CHOLECYSTECTOMY N/A 2023    Procedure: CHOLECYSTECTOMY, LAPAROSCOPIC;  Surgeon: Nikolay Johnson MD;  Location: [facility] OR;  Service: General;  Laterality: N/A;    REPAIR, HERNIA, UMBILICAL N/A 2023    Procedure: REPAIR, HERNIA, UMBILICAL;  Surgeon: Nikolay Johnson MD;  Location: [facility] OR;  Service: General;  Laterality: N/A;    TUBAL LIGATION         Social History  Ms.  reports that she has never smoked. She has never been exposed to tobacco  "smoke. She has never used smokeless tobacco. She reports current alcohol use. She reports that she does not use drugs.    Family History  Ms.'s family history includes Diabetes in her father and mother; Epilepsy in her mother; Hypertension in her father and mother.    Medications and Allergies     Medications  No outpatient medications have been marked as taking for the 6/11/24 encounter (Office Visit) with Sue Urrutia NP.       Allergies  Review of patient's allergies indicates:  No Known Allergies    Physical Examination   /85 (BP Location: Left arm, Patient Position: Sitting, BP Method: X-Large (Automatic))   Pulse 77   Temp 97.8 °F (36.6 °C) (Oral)   Resp 18   Ht 5' 8" (1.727 m)   Wt 126.5 kg (278 lb 12.8 oz)   LMP 05/21/2024 (Exact Date)   SpO2 98%   BMI 42.39 kg/m²    Physical Exam  Vitals reviewed.   Constitutional:       Appearance: Normal appearance. She is obese.   HENT:      Mouth/Throat:      Mouth: Mucous membranes are moist.   Eyes:      Pupils: Pupils are equal, round, and reactive to light.   Cardiovascular:      Rate and Rhythm: Normal rate and regular rhythm.      Pulses: Normal pulses.      Heart sounds: Normal heart sounds.   Pulmonary:      Effort: Pulmonary effort is normal.      Breath sounds: Normal breath sounds.   Abdominal:      General: Abdomen is flat. Bowel sounds are normal.      Palpations: Abdomen is soft.   Musculoskeletal:         General: Normal range of motion.      Right wrist: No swelling or tenderness. Normal range of motion. Normal pulse.      Left wrist: Tenderness present. No swelling. Normal range of motion. Normal pulse.      Cervical back: Normal range of motion and neck supple.      Comments: Left wrist positive phalen's test  Negative Tinel's sign     Skin:     General: Skin is warm and dry.      Capillary Refill: Capillary refill takes less than 2 seconds.   Neurological:      General: No focal deficit present.      Mental Status: She is alert " and oriented to person, place, and time.   Psychiatric:         Mood and Affect: Mood normal.        Assessment and Plan (including Health Maintenance)      Problem List  Smart Sets  Document Outside HM   :    Plan:   Try Ibuprofen as needed  Try wrist splint  Return to clinic if symptoms worsen and as needed.       There are no preventive care reminders to display for this patient.    Problem List Items Addressed This Visit    None      Health Maintenance Topics with due status: Not Due       Topic Last Completion Date    Pap Smear 10/11/2022    TETANUS VACCINE 05/10/2023       No future appointments.         Signature:  Sue Urrutia NP      [address] [place] [address]    Date of encounter: 6/11/24

## 2024-08-19 ENCOUNTER — OFFICE VISIT (OUTPATIENT)
Dept: FAMILY MEDICINE | Facility: CLINIC | Age: 29
End: 2024-08-19
Payer: MEDICAID

## 2024-08-19 VITALS
HEART RATE: 75 BPM | BODY MASS INDEX: 40.47 KG/M2 | OXYGEN SATURATION: 100 % | SYSTOLIC BLOOD PRESSURE: 137 MMHG | DIASTOLIC BLOOD PRESSURE: 78 MMHG | HEIGHT: 68 IN | TEMPERATURE: 98 F | WEIGHT: 267 LBS

## 2024-08-19 DIAGNOSIS — Z20.2 EXPOSURE TO SEXUALLY TRANSMITTED DISEASE (STD): ICD-10-CM

## 2024-08-19 DIAGNOSIS — Z11.3 SCREEN FOR STD (SEXUALLY TRANSMITTED DISEASE): ICD-10-CM

## 2024-08-19 DIAGNOSIS — M70.21 OLECRANON BURSITIS OF RIGHT ELBOW: Primary | ICD-10-CM

## 2024-08-19 DIAGNOSIS — R73.9 HYPERGLYCEMIA: ICD-10-CM

## 2024-08-19 DIAGNOSIS — Z11.59 SCREENING FOR VIRAL DISEASE: ICD-10-CM

## 2024-08-19 LAB
BACTERIA VAG QL WET PREP: ABNORMAL /HPF
CLUE CELLS VAG QL WET PREP: ABNORMAL /HPF
HAV IGM SER QL: NORMAL
HBV CORE IGM SER QL: NORMAL
HBV SURFACE AG SERPL QL IA: NORMAL
HCV AB SER QL: NORMAL
HIV 1+O+2 AB SERPL QL: NORMAL
RBC #/AREA VAG WET PREP: ABNORMAL /HPF
SQUAMOUS EPITHELIALS WET WOUNT, GENITAL: ABNORMAL /HPF
SYPHILIS AB INTERPRETATION: NORMAL
T VAGINALIS VAG QL WET PREP: ABNORMAL /HPF
WBC CLUMPS WET MOUNT, GENITAL: ABNORMAL /HPF
WBC VAG QL WET PREP: ABNORMAL /HPF
YEAST VAG QL WET PREP: ABNORMAL /HPF

## 2024-08-19 PROCEDURE — 3008F BODY MASS INDEX DOCD: CPT | Mod: ,,, | Performed by: NURSE PRACTITIONER

## 2024-08-19 PROCEDURE — 86695 HERPES SIMPLEX TYPE 1 TEST: CPT | Mod: ,,, | Performed by: CLINICAL MEDICAL LABORATORY

## 2024-08-19 PROCEDURE — 80074 ACUTE HEPATITIS PANEL: CPT | Mod: ,,, | Performed by: CLINICAL MEDICAL LABORATORY

## 2024-08-19 PROCEDURE — 87210 SMEAR WET MOUNT SALINE/INK: CPT | Mod: 59,,, | Performed by: CLINICAL MEDICAL LABORATORY

## 2024-08-19 PROCEDURE — 99213 OFFICE O/P EST LOW 20 MIN: CPT | Mod: ,,, | Performed by: NURSE PRACTITIONER

## 2024-08-19 PROCEDURE — 87661 TRICHOMONAS VAGINALIS AMPLIF: CPT | Mod: 59,,, | Performed by: CLINICAL MEDICAL LABORATORY

## 2024-08-19 PROCEDURE — 87801 DETECT AGNT MULT DNA AMPLI: CPT | Mod: ,,, | Performed by: CLINICAL MEDICAL LABORATORY

## 2024-08-19 PROCEDURE — 87389 HIV-1 AG W/HIV-1&-2 AB AG IA: CPT | Mod: ,,, | Performed by: CLINICAL MEDICAL LABORATORY

## 2024-08-19 PROCEDURE — 3078F DIAST BP <80 MM HG: CPT | Mod: ,,, | Performed by: NURSE PRACTITIONER

## 2024-08-19 PROCEDURE — 86696 HERPES SIMPLEX TYPE 2 TEST: CPT | Mod: ,,, | Performed by: CLINICAL MEDICAL LABORATORY

## 2024-08-19 PROCEDURE — 86780 TREPONEMA PALLIDUM: CPT | Mod: ,,, | Performed by: CLINICAL MEDICAL LABORATORY

## 2024-08-19 RX ORDER — METHYLPREDNISOLONE 4 MG/1
TABLET ORAL
Qty: 21 TABLET | Refills: 0 | Status: SHIPPED | OUTPATIENT
Start: 2024-08-19

## 2024-08-19 RX ORDER — CLINDAMYCIN HYDROCHLORIDE 300 MG/1
300 CAPSULE ORAL EVERY 8 HOURS
Qty: 30 CAPSULE | Refills: 0 | Status: SHIPPED | OUTPATIENT
Start: 2024-08-19

## 2024-08-19 NOTE — PROGRESS NOTES
"   Johanny Freeman DNP   [address] [place] [address]     PATIENT NAME: Jing Hanson  : 1995  DATE: 24  MRN: [id number]      Billing Provider: Johanny Freeman DNP  Level of Service:   Patient PCP Information       Provider PCP Type    Johanny Freeman DNP General            Reason for Visit / Chief Complaint: Vaginitis       Update PCP  Update Chief Complaint         History of Present Illness / Problem Focused Workflow     Jing Hanson presents to the clinic with Vaginitis     HPI    Review of Systems     Review of Systems     Medical / Social / Family History     Past Medical History:   Diagnosis Date    Abnormal Pap smear of cervix        Past Surgical History:   Procedure Laterality Date    APPENDECTOMY      LAPAROSCOPIC CHOLECYSTECTOMY N/A 2023    Procedure: CHOLECYSTECTOMY, LAPAROSCOPIC;  Surgeon: Nikolay Johnson MD;  Location: [facility];  Service: General;  Laterality: N/A;    REPAIR, HERNIA, UMBILICAL N/A 2023    Procedure: REPAIR, HERNIA, UMBILICAL;  Surgeon: Nikolay Johnson MD;  Location: [facility];  Service: General;  Laterality: N/A;    TUBAL LIGATION         Social History  Ms.  reports that she has never smoked. She has never been exposed to tobacco smoke. She has never used smokeless tobacco. She reports that she does not currently use alcohol. She reports that she does not use drugs.    Family History  Ms.'s family history includes Diabetes in her father and mother; Epilepsy in her mother; Hypertension in her father and mother.    Medications and Allergies     Medications  No outpatient medications have been marked as taking for the 24 encounter (Office Visit) with Johanny Freeman DNP.       Allergies  Review of patient's allergies indicates:  No Known Allergies    Physical Examination   /78   Pulse 75   Temp 98.2 °F (36.8 °C)   Ht 5' 8" (1.727 m)   Wt 121.1 kg (267 lb)   LMP 2024   SpO2 100%   BMI 40.60 kg/m²    Physical " Exam  Vitals and nursing note reviewed.   Constitutional:       Appearance: Normal appearance. She is normal weight.   HENT:      Head: Normocephalic.      Nose: Nose normal.      Mouth/Throat:      Mouth: Mucous membranes are moist.   Eyes:      Pupils: Pupils are equal, round, and reactive to light.   Cardiovascular:      Rate and Rhythm: Normal rate and regular rhythm.      Pulses: Normal pulses.      Heart sounds: Normal heart sounds.   Pulmonary:      Effort: Pulmonary effort is normal.      Breath sounds: Normal breath sounds.   Abdominal:      General: Abdomen is flat. Bowel sounds are normal.      Palpations: Abdomen is soft.   Genitourinary:     Vagina: No vaginal discharge.   Musculoskeletal:         General: Normal range of motion.      Cervical back: Normal range of motion and neck supple.   Skin:     General: Skin is warm and dry.      Capillary Refill: Capillary refill takes less than 2 seconds.      Findings: Lesion present.      Comments: Swelling , +warmth +tenderness. No erythema or drainage noted  Soft mushy in the center of rt posterior elbow.    Neurological:      General: No focal deficit present.      Mental Status: She is alert and oriented to person, place, and time. Mental status is at baseline.   Psychiatric:         Mood and Affect: Mood normal.         Behavior: Behavior normal.         Thought Content: Thought content normal.         Judgment: Judgment normal.        Assessment and Plan (including Health Maintenance)      Problem List  Smart Sets  Document Outside HM   :    Plan:         There are no preventive care reminders to display for this patient.    Problem List Items Addressed This Visit          ID    Exposure to sexually transmitted disease (STD) - Primary    Screen for STD (sexually transmitted disease)    Relevant Orders    Chlamydia/GC, PCR    Trichomonas vaginalis by PCR    Hepatitis Panel, Acute    Wet Prep, Genital    HSV 1 & 2, IgG    Treponema Pallidum (Syphillis)  Antibody    HIV 1/2 Ag/Ab (4th Gen)    Screening for viral disease    Relevant Orders    Chlamydia/GC, PCR    Trichomonas vaginalis by PCR    Hepatitis Panel, Acute    Wet Prep, Genital    HSV 1 & 2, IgG    Treponema Pallidum (Syphillis) Antibody    HIV 1/2 Ag/Ab (4th Gen)       Endocrine    BMI 35.0-35.9,adult    Hyperglycemia       Health Maintenance Topics with due status: Not Due       Topic Last Completion Date    Pap Smear 10/11/2022    TETANUS VACCINE 05/10/2023    Influenza Vaccine 03/06/2024       No future appointments.         Signature:  Johanny Freeman, DNP      [address] [place] [address]    Date of encounter: 8/19/24

## 2024-08-20 LAB
CHLAMYDIA BY PCR: NEGATIVE
HSV TYPE 1 AB IGG INDEX: 3.88
HSV TYPE 2 AB IGG INDEX: 5.94
HSV1 IGG SER QL: POSITIVE
HSV2 IGG SER QL: POSITIVE
N. GONORRHOEAE (GC) BY PCR: NEGATIVE
TRICHOMONAS NAT: POSITIVE

## 2024-08-20 RX ORDER — METRONIDAZOLE 500 MG/1
500 TABLET ORAL EVERY 12 HOURS
Qty: 14 TABLET | Refills: 0 | Status: SHIPPED | OUTPATIENT
Start: 2024-08-20

## 2024-08-20 NOTE — TELEPHONE ENCOUNTER
----- Message from Johanny Freeman, DNP sent at 8/20/2024  7:22 AM CDT -----  Find out where she wants meds sent to complete all meds for trich   Flagyl bid x7 days   She has recently moved to [place]

## (undated) DEVICE — GLOVE PROTEXIS PI SYN SURG 6.5

## (undated) DEVICE — KIT ANTIFOG W/SPONG & FLUID

## (undated) DEVICE — APPLIER CLIP ENDO LIGAMAX 5MM

## (undated) DEVICE — SEALER LIGASURE IMPACT 18CM

## (undated) DEVICE — BAG TISS RETRV MONARCH 10MM

## (undated) DEVICE — SUT 4-0 VICRYL / FS-2

## (undated) DEVICE — TROCAR KII BLLN 12MM 10CM

## (undated) DEVICE — GOWN NONREINF SET-IN SLV 2XL

## (undated) DEVICE — SUT PDS II O CT-2 VIL MONO

## (undated) DEVICE — CORD LAP 10 DISP

## (undated) DEVICE — TROCAR KII OPTICAL 5X100MM

## (undated) DEVICE — SEE MEDLINE ITEM 159592

## (undated) DEVICE — CANNULA LAP SEAL Z THRD 5X100

## (undated) DEVICE — KIT BASIC RUSH

## (undated) DEVICE — SUT MONOCYRL 4-0 PS2 UND

## (undated) DEVICE — WARMER BLUE HEAT SCOPE 3-12MM

## (undated) DEVICE — GLOVE PROTEXIS PI SYN SURG 7

## (undated) DEVICE — DISSECTOR KITTNER 5MM T 45CM S

## (undated) DEVICE — CART INSERT SCISSOR F 5X34CM

## (undated) DEVICE — GLOVE PROTEXIS PI SYN SURG 8.0

## (undated) DEVICE — SUT ETHIBOND EXCEL 0 CT2 30

## (undated) DEVICE — SOL NACL IRR 1000ML BTL

## (undated) DEVICE — TUBING INSUF LAP HF STRL 10FT

## (undated) DEVICE — GLOVE PROTEXIS PI SYN SURG 7.5

## (undated) DEVICE — ELECTRODE LAPSCP L HOOK 36CM